# Patient Record
Sex: FEMALE | Race: WHITE | NOT HISPANIC OR LATINO | Employment: OTHER | URBAN - METROPOLITAN AREA
[De-identification: names, ages, dates, MRNs, and addresses within clinical notes are randomized per-mention and may not be internally consistent; named-entity substitution may affect disease eponyms.]

---

## 2019-08-20 ENCOUNTER — CONSULT (OUTPATIENT)
Dept: CARDIOLOGY | Facility: CLINIC | Age: 59
End: 2019-08-20

## 2019-08-20 VITALS
BODY MASS INDEX: 22.28 KG/M2 | DIASTOLIC BLOOD PRESSURE: 72 MMHG | HEIGHT: 68 IN | WEIGHT: 147 LBS | SYSTOLIC BLOOD PRESSURE: 114 MMHG | HEART RATE: 65 BPM

## 2019-08-20 DIAGNOSIS — Z82.49 FAMILY HISTORY OF CORONARY ARTERY DISEASE: ICD-10-CM

## 2019-08-20 DIAGNOSIS — R07.9 CHEST PAIN IN ADULT: ICD-10-CM

## 2019-08-20 DIAGNOSIS — R94.31 ABNORMAL EKG: Primary | ICD-10-CM

## 2019-08-20 PROCEDURE — 99204 OFFICE O/P NEW MOD 45 MIN: CPT | Performed by: INTERNAL MEDICINE

## 2019-08-20 PROCEDURE — 93000 ELECTROCARDIOGRAM COMPLETE: CPT | Performed by: INTERNAL MEDICINE

## 2019-08-20 RX ORDER — GABAPENTIN 300 MG/1
300 CAPSULE ORAL 3 TIMES DAILY
COMMUNITY
Start: 2019-07-17

## 2019-08-20 RX ORDER — FLUOXETINE HYDROCHLORIDE 40 MG/1
40 CAPSULE ORAL DAILY
COMMUNITY
Start: 2019-07-17

## 2019-08-20 RX ORDER — SODIUM PHOSPHATE,MONO-DIBASIC 19G-7G/118
500 ENEMA (ML) RECTAL
COMMUNITY

## 2019-08-20 RX ORDER — LORAZEPAM 1 MG/1
0.5 TABLET ORAL DAILY
COMMUNITY
Start: 2019-08-17

## 2019-08-20 RX ORDER — BIOTIN 10 MG
TABLET ORAL 3 TIMES WEEKLY
COMMUNITY

## 2019-08-20 RX ORDER — MELATONIN
5000 DAILY
COMMUNITY

## 2019-08-20 RX ORDER — FAMOTIDINE 20 MG/1
20 TABLET, FILM COATED ORAL DAILY
Qty: 30 TABLET | Refills: 11 | Status: SHIPPED | OUTPATIENT
Start: 2019-08-20

## 2019-08-20 RX ORDER — VITAMIN E 268 MG
800 CAPSULE ORAL DAILY
COMMUNITY

## 2019-08-20 RX ORDER — HYDROXYCHLOROQUINE SULFATE 200 MG/1
200 TABLET, FILM COATED ORAL 2 TIMES DAILY
COMMUNITY

## 2019-08-20 RX ORDER — ALPRAZOLAM 0.5 MG/1
TABLET ORAL DAILY
COMMUNITY
Start: 2019-07-28

## 2019-08-20 NOTE — PROGRESS NOTES
Encounter Date:08/20/2019    Location: [facility] MD    Patient ID: Lisa Mendez is a 59 y.o. female    1960  Subjective:      Chief Complaint/Reason for visit:    Chief Complaint   Patient presents with   • Atypical chest pain     Consult   • Dizziness   • Shortness of Breath       Problem List:  1. Chest pain  2. Shortness of breath  1. CXR 7/23/19: No acute cardiopulmonary disease  2. CTA of chest, 7/2019: No evidence of PE.  No evidence of aortic dissection.  3. Sleep apnea  4. History of DVT  5. GERD  6. Fibromyalgia  7. Rheumatoid arthritis  8. Surgeries:  1. Hysterectomy  2. Lumbar fusion L4-L5 with spinal cord stimulator  3. Bariatric surgery    HPI:  Patient is a 59-year-old female with the above-noted medical history presents today for further evaluation of her recent chest pain that occurred approximately 1 month ago.  She states that it occurred after eating lunch and was midsternal with radiation up to her shoulders and down to her hands as well as into her back.  She was seen and evaluated at [facility] and states that her cardiac work-up was negative.  She states that her pain can last anywhere from a few seconds up to 30 minutes in duration.  She notices occasional palpitations.  There is associated shortness of breath and slight dizziness but no vomiting or diaphoresis.  She states she had a history of stress test 6 to 7 years ago but does not recall ever having a cardiac catheterization.  Her father did have a known history of coronary disease.  Her lipids were checked in July of this year with an LDL of 87 and an HDL of 75.  She ambulates with a rolling walker due to chronic back issues so her physical activity is limited.  Her blood pressure and heart rate are adequately controlled on no medical therapy.      Cardiac ROS:  Positive for chest pain, shortness of breath, dyspnea on exertion, palpitations, dizziness.  Negative for orthopnea, PND, fatigue, edema,  pre-syncope/ syncope, snoring/ CEDRIC    Cardiac Risk Factors: sedentary lifestyle  Social History     Socioeconomic History   • Marital status:      Spouse name: Not on file   • Number of children: Not on file   • Years of education: Not on file   • Highest education level: Not on file   Tobacco Use   • Smoking status: Never Smoker   • Smokeless tobacco: Never Used   Substance and Sexual Activity   • Alcohol use: No     Frequency: Never   • Drug use: No   • Sexual activity: Defer       family history is not on file.     has a past medical history of Abnormal EKG (8/20/2019), Chest pain in adult (8/20/2019), and Family history of coronary artery disease (8/20/2019).    Allergies   Allergen Reactions   • Clindamycin/Lincomycin Other (See Comments)     Degenerative disc disease      • Darvon [Propoxyphene] Other (See Comments)     Caused Rheumatoid arthritis     • Doxycycline Other (See Comments)     Caused diverticulitis     • Levofloxacin Other (See Comments)     Caused Ulcers    • Rifampin Other (See Comments)     Cause Acid reflux     • Septra [Sulfamethoxazole-Trimethoprim] Other (See Comments)     Caused Fibromyalgia      • Tramadol Other (See Comments)     Caused Chronic nerve damage     • Ultram [Tramadol Hcl] Other (See Comments)     Sleep apnea           Current Outpatient Medications:   •  ALPRAZolam (XANAX) 0.5 MG tablet, Daily., Disp: , Rfl:   •  CALCIUM-VITAMIN D PO, Take  by mouth 2 (Two) Times a Day., Disp: , Rfl:   •  cholecalciferol (VITAMIN D3) 1000 units tablet, Take 5,000 Units by mouth Daily., Disp: , Rfl:   •  Cyanocobalamin (VITAMIN B-12) 3000 MCG sublingual tablet, Place  under the tongue 3 (Three) Times a Week., Disp: , Rfl:   •  FERROUS GLUCONATE PO, Take 324 mg by mouth Daily., Disp: , Rfl:   •  FLUoxetine (PROzac) 40 MG capsule, Daily., Disp: , Rfl:   •  gabapentin (NEURONTIN) 300 MG capsule, 3 (Three) Times a Day., Disp: , Rfl:   •  glucosamine sulfate 500 MG capsule capsule, Take 500  mg by mouth 3 (Three) Times a Day With Meals., Disp: , Rfl:   •  hydroxychloroquine (PLAQUENIL) 200 MG tablet, Take 200 mg by mouth 2 (Two) Times a Day., Disp: , Rfl:   •  LORazepam (ATIVAN) 1 MG tablet, Daily., Disp: , Rfl:   •  Multiple Vitamins-Minerals (MULTIVITAMIN ADULT PO), Take  by mouth 2 (Two) Times a Day., Disp: , Rfl:   •  Omega-3 Fatty Acids (OMEGA 3 PO), Take  by mouth 2 (Two) Times a Day., Disp: , Rfl:   •  vitamin E 400 UNIT capsule, Take 800 Units by mouth Daily., Disp: , Rfl:     Review of Systems   Constitution: Negative.   HENT: Negative.    Eyes: Negative.         Wears glasses   Cardiovascular: Positive for chest pain, dyspnea on exertion and palpitations.   Respiratory: Positive for shortness of breath.    Endocrine: Negative.    Hematologic/Lymphatic: Negative.    Skin: Negative.    Musculoskeletal: Positive for arthritis and back pain.   Gastrointestinal: Positive for heartburn.   Genitourinary: Negative.    Neurological: Positive for dizziness.   Psychiatric/Behavioral: Negative.    Allergic/Immunologic: Negative.        Vitals:    08/20/19 1308   BP: 114/72   Pulse: 65          Objective:       Physical Exam   Constitutional: She is oriented to person, place, and time. She appears well-developed and well-nourished.   HENT:   Head: Normocephalic and atraumatic.   Eyes: Pupils are equal, round, and reactive to light. Right eye exhibits no discharge. Left eye exhibits no discharge.   Neck: Normal range of motion. Neck supple. No JVD present.   Cardiovascular: Normal rate, regular rhythm, normal heart sounds and intact distal pulses. Exam reveals no gallop and no friction rub.   No murmur heard.  1+ distal pulses bilaterally   Pulmonary/Chest: Effort normal and breath sounds normal. She has no wheezes.   Abdominal: Soft. Bowel sounds are normal. There is no tenderness.   Musculoskeletal: Normal range of motion. She exhibits no edema.   Neurological: She is alert and oriented to person, place,  and time.   Skin: Skin is warm and dry.   Psychiatric: She has a normal mood and affect. Her behavior is normal.       Data Review:     ECG 12 Lead  Date/Time: 8/20/2019 1:29 PM  Performed by: Abbey Conde APRN  Authorized by: Abbey Conde APRN   Comparison: compared with previous ECG from 7/23/2019  Similar to previous ECG  Rhythm: sinus rhythm  Rate: normal  BPM: 65  Other findings: non-specific ST-T wave changes    Clinical impression: abnormal EKG        Assessment:      ICD-10-CM ICD-9-CM   1. Abnormal EKG R94.31 794.31   2. Chest pain in adult R07.9 786.50   3. Family history of coronary artery disease Z82.49 V17.3       Plan:  Lexiscan cardiolite to further assess symptoms  Start Pepcid 20mg daily   Encouraged routine activity as tolerated and dietary modifications  Continue current medications as directed  F/up in 4-6 weeks or sooner if needed.        Scribed for Eulalia Hermosillo MD by JAMEL Tang. 8/20/2019  4:31 PM     Eulalia Hermosillo MD, FACC

## 2019-09-17 ENCOUNTER — HOSPITAL ENCOUNTER (OUTPATIENT)
Dept: CARDIOLOGY | Facility: HOSPITAL | Age: 59
Discharge: HOME | End: 2019-09-17

## 2019-09-17 ENCOUNTER — HOSPITAL ENCOUNTER (OUTPATIENT)
Dept: CARDIOLOGY | Facility: HOSPITAL | Age: 59
Discharge: HOME | End: 2019-09-17
Admitting: NURSE PRACTITIONER

## 2019-09-17 DIAGNOSIS — R94.31 ABNORMAL EKG: ICD-10-CM

## 2019-09-17 LAB
BH CV STRESS BP STAGE 2: NORMAL
BH CV STRESS BP STAGE 4: NORMAL
BH CV STRESS COMMENTS STAGE 1: NORMAL
BH CV STRESS DOSE REGADENOSON STAGE 1: 0.4
BH CV STRESS DURATION MIN STAGE 1: 1
BH CV STRESS DURATION MIN STAGE 2: 1
BH CV STRESS DURATION MIN STAGE 3: 1
BH CV STRESS DURATION MIN STAGE 4: 1
BH CV STRESS DURATION SEC STAGE 1: 10
BH CV STRESS DURATION SEC STAGE 2: 0
BH CV STRESS HR STAGE 1: 65
BH CV STRESS HR STAGE 2: 71
BH CV STRESS HR STAGE 3: 68
BH CV STRESS HR STAGE 4: 66
BH CV STRESS PROTOCOL 1: NORMAL
BH CV STRESS RECOVERY BP: NORMAL MMHG
BH CV STRESS RECOVERY HR: 64 BPM
BH CV STRESS STAGE 1: 1
BH CV STRESS STAGE 2: 2
BH CV STRESS STAGE 3: 3
BH CV STRESS STAGE 4: 4
LV EF NUC BP: 74 %
MAXIMAL PREDICTED HEART RATE: 159 BPM
PERCENT MAX PREDICTED HR: 45.28 %
STRESS BASELINE BP: NORMAL MMHG
STRESS BASELINE HR: 53 BPM
STRESS PERCENT HR: 53 %
STRESS POST PEAK BP: NORMAL MMHG
STRESS POST PEAK HR: 72 BPM
STRESS TARGET HR: 135 BPM

## 2019-09-17 PROCEDURE — 25010000002 REGADENOSON 0.4 MG/5ML SOLUTION: Performed by: NURSE PRACTITIONER

## 2019-09-17 PROCEDURE — 78451 HT MUSCLE IMAGE SPECT SING: CPT

## 2019-09-17 PROCEDURE — 93017 CV STRESS TEST TRACING ONLY: CPT

## 2019-09-17 PROCEDURE — A9500 TC99M SESTAMIBI: HCPCS | Performed by: NURSE PRACTITIONER

## 2019-09-17 PROCEDURE — 93018 CV STRESS TEST I&R ONLY: CPT | Performed by: INTERNAL MEDICINE

## 2019-09-17 PROCEDURE — 0 TECHNETIUM SESTAMIBI: Performed by: NURSE PRACTITIONER

## 2019-09-17 PROCEDURE — 78451 HT MUSCLE IMAGE SPECT SING: CPT | Performed by: INTERNAL MEDICINE

## 2019-09-17 RX ADMIN — TECHNETIUM TC 99M SESTAMIBI 1 DOSE: 1 INJECTION INTRAVENOUS at 12:25

## 2019-09-17 RX ADMIN — TECHNETIUM TC 99M SESTAMIBI 1 DOSE: 1 INJECTION INTRAVENOUS at 10:50

## 2019-09-17 RX ADMIN — REGADENOSON 0.4 MG: 0.08 INJECTION, SOLUTION INTRAVENOUS at 12:28

## 2019-09-18 ENCOUNTER — OFFICE VISIT (OUTPATIENT)
Dept: CARDIOLOGY | Facility: CLINIC | Age: 59
End: 2019-09-18

## 2019-09-18 VITALS
HEIGHT: 68 IN | HEART RATE: 60 BPM | BODY MASS INDEX: 23.01 KG/M2 | OXYGEN SATURATION: 95 % | WEIGHT: 151.8 LBS | SYSTOLIC BLOOD PRESSURE: 110 MMHG | DIASTOLIC BLOOD PRESSURE: 60 MMHG

## 2019-09-18 DIAGNOSIS — R07.9 CHEST PAIN IN ADULT: ICD-10-CM

## 2019-09-18 DIAGNOSIS — R00.2 PALPITATIONS: Primary | ICD-10-CM

## 2019-09-18 PROBLEM — R06.02 SHORTNESS OF BREATH: Status: ACTIVE | Noted: 2019-09-18

## 2019-09-18 PROCEDURE — 99214 OFFICE O/P EST MOD 30 MIN: CPT | Performed by: INTERNAL MEDICINE

## 2019-09-18 RX ORDER — OXYCODONE AND ACETAMINOPHEN 7.5; 325 MG/1; MG/1
TABLET ORAL AS NEEDED
COMMUNITY
Start: 2019-07-31 | End: 2019-09-18

## 2019-09-18 NOTE — PROGRESS NOTES
[facility]  Lisa Mendez  1960  59 y.o.  [phone]      Date: 09/18/2019    PCP: Pan Carrion MD    Chief Complaint   Patient presents with   • Chest Pain       Problem List:  1. Chest pain:  a. MPS, 09/17/2019: No evidence of ischemia. EF 74%.  2. Shortness of breath:  a. CXR, 07/23/2019: No acute cardiopulmonary disease.  b. CTA of chest, 07/2019: No evidence of PE.  No evidence of aortic dissection.  3. Palpitations  4. Sleep apnea  5. History of DVT  6. GERD  7. Fibromyalgia  8. Rheumatoid arthritis  9. Surgeries:  a. Hysterectomy  b. Lumbar fusion L4-L5 with spinal cord stimulator  c. Bariatric surgery       Allergies   Allergen Reactions   • Clindamycin/Lincomycin Other (See Comments)     Degenerative disc disease      • Darvon [Propoxyphene] Other (See Comments)     Caused Rheumatoid arthritis     • Doxycycline Other (See Comments)     Caused diverticulitis     • Levofloxacin Other (See Comments)     Caused Ulcers    • Rifampin Other (See Comments)     Cause Acid reflux     • Septra [Sulfamethoxazole-Trimethoprim] Other (See Comments)     Caused Fibromyalgia      • Tramadol Other (See Comments)     Caused Chronic nerve damage     • Ultram [Tramadol Hcl] Other (See Comments)     Sleep apnea         Current Medications:      Current Outpatient Medications:   •  ALPRAZolam (XANAX) 0.5 MG tablet, Daily., Disp: , Rfl:   •  CALCIUM-VITAMIN D PO, Take  by mouth 2 (Two) Times a Day., Disp: , Rfl:   •  cholecalciferol (VITAMIN D3) 1000 units tablet, Take 5,000 Units by mouth Daily., Disp: , Rfl:   •  Cyanocobalamin (VITAMIN B-12) 3000 MCG sublingual tablet, Place  under the tongue 3 (Three) Times a Week., Disp: , Rfl:   •  famotidine (PEPCID) 20 MG tablet, Take 1 tablet by mouth Daily., Disp: 30 tablet, Rfl: 11  •  FERROUS GLUCONATE PO, Take 324 mg by mouth Daily., Disp: , Rfl:   •  FLUoxetine (PROzac) 40 MG capsule, Daily., Disp: , Rfl:   •   "gabapentin (NEURONTIN) 300 MG capsule, 3 (Three) Times a Day., Disp: , Rfl:   •  glucosamine sulfate 500 MG capsule capsule, Take 500 mg by mouth 3 (Three) Times a Day With Meals., Disp: , Rfl:   •  hydroxychloroquine (PLAQUENIL) 200 MG tablet, Take 200 mg by mouth 2 (Two) Times a Day., Disp: , Rfl:   •  LORazepam (ATIVAN) 1 MG tablet, Daily., Disp: , Rfl:   •  Multiple Vitamins-Minerals (MULTIVITAMIN ADULT PO), Take  by mouth 2 (Two) Times a Day., Disp: , Rfl:   •  Omega-3 Fatty Acids (OMEGA 3 PO), Take  by mouth 2 (Two) Times a Day., Disp: , Rfl:   •  vitamin E 400 UNIT capsule, Take 800 Units by mouth Daily., Disp: , Rfl:   No current facility-administered medications for this visit.     HPI    Lisa Mendez is a 59 y.o. female who presents today for 4 week follow up of chest pain. Since last visit, she has continued to experience chest pain and occasional palpitations. Her daily Pepcid has helped manage some of her chest pains, which occur intermittently, both at rest and with exercise. She denies any palpitations during her stress test. She experiences them with varying frequency. When they occur, patient reports a lot of fatigue. She takes up to a day to recover from her palpitations, which can occur as rarely as once a week or as frequently as a couple times per week. Patient has had to take some time off work to recover from these palpitations. Patient denies edema, dizziness, and syncope.     The following portions of the patient's history were reviewed and updated as appropriate: allergies, current medications and problem list.    Pertinent positives as listed in the HPI.  All other systems reviewed are negative.    Vitals:    09/18/19 1034   BP: 110/60   BP Location: Right arm   Patient Position: Sitting   Pulse: 60   SpO2: 95%   Weight: 68.9 kg (151 lb 12.8 oz)   Height: 172.7 cm (68\")       Physical Exam:    General: Alert and oriented.  Neck: Jugular venous pressure is within normal limits. Carotids " have normal upstrokes without bruits.   Cardiovascular: Heart has a nondisplaced focal PMI. Regular rate and rhythm without murmur, gallop or rub.  Lungs: Clear without rales or wheezes. Equal expansion is noted.   Extremities: Show no edema.  Skin: Warm and dry.  Neurologic: Nonfocal.    Diagnostic Data:    Procedures    Assessment:      ICD-10-CM ICD-9-CM   1. Palpitations R00.2 785.1   2. Chest pain in adult R07.9 786.50     Plan:    1. 30 day event monitor to assess for PAF given patient's palpitations and subsequent fatigue.  2. Increase Pepcid to 20 mg BID for chest pain, likely of GI etiology.  3. Continue all other current medications.  4. F/up in 5-6 weeks, sooner if needed.      Scribed for Eulalia Hermosillo MD by Julissa Linares. 9/18/2019  11:00 AM     Eulalia Hermosillo MD, FACC

## 2019-10-31 ENCOUNTER — OFFICE VISIT (OUTPATIENT)
Dept: CARDIOLOGY | Facility: CLINIC | Age: 59
End: 2019-10-31

## 2019-10-31 VITALS
SYSTOLIC BLOOD PRESSURE: 114 MMHG | HEART RATE: 61 BPM | DIASTOLIC BLOOD PRESSURE: 60 MMHG | WEIGHT: 154 LBS | HEIGHT: 68 IN | BODY MASS INDEX: 23.34 KG/M2

## 2019-10-31 DIAGNOSIS — Z82.49 FAMILY HISTORY OF CORONARY ARTERY DISEASE: ICD-10-CM

## 2019-10-31 DIAGNOSIS — F41.9 ANXIETY: ICD-10-CM

## 2019-10-31 DIAGNOSIS — I49.1 PAC (PREMATURE ATRIAL CONTRACTION): ICD-10-CM

## 2019-10-31 DIAGNOSIS — I49.3 PVC (PREMATURE VENTRICULAR CONTRACTION): ICD-10-CM

## 2019-10-31 DIAGNOSIS — R07.89 ATYPICAL CHEST PAIN: ICD-10-CM

## 2019-10-31 DIAGNOSIS — R00.2 PALPITATIONS: Primary | ICD-10-CM

## 2019-10-31 PROBLEM — R06.02 SHORTNESS OF BREATH: Status: RESOLVED | Noted: 2019-09-18 | Resolved: 2019-10-31

## 2019-10-31 PROBLEM — R94.31 ABNORMAL EKG: Status: RESOLVED | Noted: 2019-08-20 | Resolved: 2019-10-31

## 2019-10-31 PROCEDURE — 99214 OFFICE O/P EST MOD 30 MIN: CPT | Performed by: NURSE PRACTITIONER

## 2019-10-31 NOTE — PROGRESS NOTES
[facility]  Lisa Mendez  1960  59 y.o.  [phone]      Date: 10/31/2019    PCP: Pan Carrion MD    Chief Complaint   Patient presents with   • Palpitations       Problem List:  1. Chest pain:  a. MPS, 09/17/2019: No evidence of ischemia. EF 74%.  2. Shortness of breath:  a. CXR, 07/23/2019: No acute cardiopulmonary disease.  b. CTA of chest, 07/2019: No evidence of PE. No evidence of aortic dissection.  3. Palpitations:  a. Cardiac event monitor, 09/18/2019, 30 days: SR/PAC/PVC.  4. Sleep apnea  5. History of DVT  6. GERD  7. Fibromyalgia  8. Rheumatoid arthritis  9. Surgeries:  a. Hysterectomy  b. Lumbar fusion L4-L5 with spinal cord stimulator  c. Bariatric surgery    Allergies   Allergen Reactions   • Clindamycin/Lincomycin Other (See Comments)     Degenerative disc disease      • Darvon [Propoxyphene] Other (See Comments)     Caused Rheumatoid arthritis     • Doxycycline Other (See Comments)     Caused diverticulitis     • Levofloxacin Other (See Comments)     Caused Ulcers    • Rifampin Other (See Comments)     Cause Acid reflux     • Septra [Sulfamethoxazole-Trimethoprim] Other (See Comments)     Caused Fibromyalgia      • Tramadol Other (See Comments)     Caused Chronic nerve damage     • Ultram [Tramadol Hcl] Other (See Comments)     Sleep apnea         Current Medications:      Current Outpatient Medications:   •  ALPRAZolam (XANAX) 0.5 MG tablet, Daily., Disp: , Rfl:   •  CALCIUM-VITAMIN D PO, Take  by mouth 2 (Two) Times a Day., Disp: , Rfl:   •  cholecalciferol (VITAMIN D3) 1000 units tablet, Take 5,000 Units by mouth Daily., Disp: , Rfl:   •  Cyanocobalamin (VITAMIN B-12) 3000 MCG sublingual tablet, Place  under the tongue 3 (Three) Times a Week., Disp: , Rfl:   •  famotidine (PEPCID) 20 MG tablet, Take 1 tablet by mouth Daily., Disp: 30 tablet, Rfl: 11  •  FERROUS GLUCONATE PO, Take 324 mg by mouth Daily., Disp: , Rfl:   •  FLUoxetine  (PROzac) 40 MG capsule, Take 40 mg by mouth Daily., Disp: , Rfl:   •  gabapentin (NEURONTIN) 300 MG capsule, Take 300 mg by mouth 3 (Three) Times a Day., Disp: , Rfl:   •  glucosamine sulfate 500 MG capsule capsule, Take 500 mg by mouth 3 (Three) Times a Day With Meals., Disp: , Rfl:   •  hydroxychloroquine (PLAQUENIL) 200 MG tablet, Take 200 mg by mouth 2 (Two) Times a Day., Disp: , Rfl:   •  LORazepam (ATIVAN) 1 MG tablet, Take 0.5 mg by mouth Daily., Disp: , Rfl:   •  Multiple Vitamins-Minerals (MULTIVITAMIN ADULT PO), Take 1 tablet by mouth 2 (Two) Times a Day., Disp: , Rfl:   •  Omega-3 Fatty Acids (OMEGA 3 PO), Take 1 capsule by mouth 2 (Two) Times a Day., Disp: , Rfl:   •  vitamin E 400 UNIT capsule, Take 800 Units by mouth Daily., Disp: , Rfl:     HPI    Lisa Mendez is a 59 y.o. female who presents today for 6 week follow up of chest pain, shortness of air, and palpitations. Since last visit the patient wore 30-day monitor.  No atrial fibrillation was detected.  Her symptoms course bonded with sinus rhythm and occasional PVCs and PACs.  The patient denies having any further chest pain after her Pepcid dose was increased at her last visit.  She denies any weakness, lightheadedness or syncope.  She had a stress test in September that was negative for ischemia.  She has not had an echocardiogram.  She reports having fatigue but also reports she has fibromyalgia.  She is under a lot of stress at home and she reports her palpitations often make her feel nervous and increases her anxiety levels.  She reports they are brief when they occur.      The following portions of the patient's history were reviewed and updated as appropriate: allergies, current medications and problem list.    Pertinent positives as listed in the HPI.  All other systems reviewed are negative.    Vitals:    10/31/19 0921   BP: 114/60   BP Location: Right arm   Patient Position: Sitting   Pulse: 61   Weight: 69.9 kg (154 lb)   Height:  "172.7 cm (68\")       Physical Exam:    General: Alert and oriented.  Neck: Jugular venous pressure is within normal limits. Carotids have normal upstrokes without bruits.   Cardiovascular: Heart has a nondisplaced focal PMI. Regular rate and rhythm without murmur, gallop or rub.  Lungs: Clear without rales or wheezes. Equal expansion is noted.   Extremities: Show no edema.  Skin: Warm and dry.  Neurologic: Nonfocal.    Diagnostic Data:    Procedures    Assessment:      ICD-10-CM ICD-9-CM   1. Palpitations R00.2 785.1   2. PAC (premature atrial contraction) I49.1 427.61   3. PVC (premature ventricular contraction) I49.3 427.69   4. Family history of coronary artery disease Z82.49 V17.3   5. Atypical chest pain R07.89 786.59   6. Anxiety F41.9 300.00     Lab results found above were reviewed with the patient.  Patient's monitor showed rare PACs and PVCs.  We discussed that treatment is not necessary and would be more for her comfort.  Concern is initiating a beta-blocker may make her fatigue worse with her fibromyalgia.  Plus she has a baseline heart rate is 61 and blood pressure 114/60.  The patient reports she does not really feel bad when they happen but more nervous than anything.  She actually reports they have decreased since her last visit and they are transient and brief when they occur.  I will obtain an echocardiogram as she has not had one.  If her echo is cardiogram is normal we will defer any medical therapy.  We will have her follow-up in 6 months for reassessment of her symptoms.    Plan:    1. Benign 30-day monitor  2. Obtain echocardiogram  3. Continue all other current medications.  4. Patient to contact us if palpitations progress or worsen  5. F/up in 6 months or sooner if needed.    Janie Shea, JAMEL          "

## 2019-12-05 ENCOUNTER — HOSPITAL ENCOUNTER (OUTPATIENT)
Dept: CARDIOLOGY | Facility: HOSPITAL | Age: 59
Discharge: HOME | End: 2019-12-05
Admitting: NURSE PRACTITIONER

## 2019-12-05 VITALS — HEIGHT: 68 IN | WEIGHT: 154 LBS | BODY MASS INDEX: 23.34 KG/M2

## 2019-12-05 DIAGNOSIS — I49.3 PVC (PREMATURE VENTRICULAR CONTRACTION): ICD-10-CM

## 2019-12-05 DIAGNOSIS — I49.1 PAC (PREMATURE ATRIAL CONTRACTION): ICD-10-CM

## 2019-12-05 DIAGNOSIS — R00.2 PALPITATIONS: ICD-10-CM

## 2019-12-05 PROCEDURE — 93306 TTE W/DOPPLER COMPLETE: CPT | Performed by: INTERNAL MEDICINE

## 2019-12-05 PROCEDURE — 93306 TTE W/DOPPLER COMPLETE: CPT

## 2019-12-06 LAB
BH CV ECHO MEAS - AO MAX PG (FULL): 2.4 MMHG
BH CV ECHO MEAS - AO MAX PG: 6 MMHG
BH CV ECHO MEAS - AO MEAN PG (FULL): 1 MMHG
BH CV ECHO MEAS - AO MEAN PG: 3 MMHG
BH CV ECHO MEAS - AO ROOT AREA (BSA CORRECTED): 1.4
BH CV ECHO MEAS - AO ROOT AREA: 4.9 CM^2
BH CV ECHO MEAS - AO ROOT DIAM: 2.5 CM
BH CV ECHO MEAS - AO V2 MAX: 119 CM/SEC
BH CV ECHO MEAS - AO V2 MEAN: 80.7 CM/SEC
BH CV ECHO MEAS - AO V2 VTI: 29.7 CM
BH CV ECHO MEAS - AVA(I,A): 1.9 CM^2
BH CV ECHO MEAS - AVA(I,D): 1.9 CM^2
BH CV ECHO MEAS - AVA(V,A): 2 CM^2
BH CV ECHO MEAS - AVA(V,D): 2 CM^2
BH CV ECHO MEAS - BSA(HAYCOCK): 1.8 M^2
BH CV ECHO MEAS - BSA: 1.8 M^2
BH CV ECHO MEAS - BZI_BMI: 23.4 KILOGRAMS/M^2
BH CV ECHO MEAS - BZI_METRIC_HEIGHT: 172.7 CM
BH CV ECHO MEAS - BZI_METRIC_WEIGHT: 69.9 KG
BH CV ECHO MEAS - EDV(CUBED): 79.5 ML
BH CV ECHO MEAS - EDV(MOD-SP2): 77.9 ML
BH CV ECHO MEAS - EDV(MOD-SP4): 75.9 ML
BH CV ECHO MEAS - EDV(TEICH): 83.1 ML
BH CV ECHO MEAS - EF(CUBED): 54.8 %
BH CV ECHO MEAS - EF(MOD-BP): 59.2 %
BH CV ECHO MEAS - EF(MOD-SP2): 63.5 %
BH CV ECHO MEAS - EF(MOD-SP4): 56.4 %
BH CV ECHO MEAS - EF(TEICH): 46.9 %
BH CV ECHO MEAS - ESV(CUBED): 35.9 ML
BH CV ECHO MEAS - ESV(MOD-SP2): 28.4 ML
BH CV ECHO MEAS - ESV(MOD-SP4): 33.1 ML
BH CV ECHO MEAS - ESV(TEICH): 44.1 ML
BH CV ECHO MEAS - FS: 23.3 %
BH CV ECHO MEAS - IVS/LVPW: 0.71
BH CV ECHO MEAS - IVSD: 0.5 CM
BH CV ECHO MEAS - LA DIMENSION: 3.4 CM
BH CV ECHO MEAS - LA/AO: 1.4
BH CV ECHO MEAS - LAD MAJOR: 4.7 CM
BH CV ECHO MEAS - LAT PEAK E' VEL: 9.6 CM/SEC
BH CV ECHO MEAS - LATERAL E/E' RATIO: 9.5
BH CV ECHO MEAS - LV DIASTOLIC VOL/BSA (35-75): 41.5 ML/M^2
BH CV ECHO MEAS - LV MASS(C)D: 72.9 GRAMS
BH CV ECHO MEAS - LV MASS(C)DI: 39.8 GRAMS/M^2
BH CV ECHO MEAS - LV MAX PG: 3.6 MMHG
BH CV ECHO MEAS - LV MEAN PG: 2 MMHG
BH CV ECHO MEAS - LV SYSTOLIC VOL/BSA (12-30): 18.1 ML/M^2
BH CV ECHO MEAS - LV V1 MAX: 95.3 CM/SEC
BH CV ECHO MEAS - LV V1 MEAN: 66.4 CM/SEC
BH CV ECHO MEAS - LV V1 VTI: 21.9 CM
BH CV ECHO MEAS - LVIDD: 4.3 CM
BH CV ECHO MEAS - LVIDS: 3.3 CM
BH CV ECHO MEAS - LVLD AP2: 6.9 CM
BH CV ECHO MEAS - LVLD AP4: 7.5 CM
BH CV ECHO MEAS - LVLS AP2: 5.4 CM
BH CV ECHO MEAS - LVLS AP4: 6 CM
BH CV ECHO MEAS - LVOT AREA (M): 2.5 CM^2
BH CV ECHO MEAS - LVOT AREA: 2.5 CM^2
BH CV ECHO MEAS - LVOT DIAM: 1.8 CM
BH CV ECHO MEAS - LVPWD: 0.7 CM
BH CV ECHO MEAS - MED PEAK E' VEL: 7.6 CM/SEC
BH CV ECHO MEAS - MEDIAL E/E' RATIO: 11.9
BH CV ECHO MEAS - MV A MAX VEL: 47.6 CM/SEC
BH CV ECHO MEAS - MV DEC TIME: 0.3 SEC
BH CV ECHO MEAS - MV E MAX VEL: 90.8 CM/SEC
BH CV ECHO MEAS - MV E/A: 1.9
BH CV ECHO MEAS - PA ACC TIME: 0.19 SEC
BH CV ECHO MEAS - PA MAX PG: 2.7 MMHG
BH CV ECHO MEAS - PA MEAN PG: 1 MMHG
BH CV ECHO MEAS - PA PR(ACCEL): -4.9 MMHG
BH CV ECHO MEAS - PA V2 MAX: 81.7 CM/SEC
BH CV ECHO MEAS - PA V2 MEAN: 58.7 CM/SEC
BH CV ECHO MEAS - PA V2 VTI: 20.4 CM
BH CV ECHO MEAS - RAP SYSTOLE: 8 MMHG
BH CV ECHO MEAS - RVSP: 29 MMHG
BH CV ECHO MEAS - SI(AO): 79.7 ML/M^2
BH CV ECHO MEAS - SI(CUBED): 23.8 ML/M^2
BH CV ECHO MEAS - SI(LVOT): 30.5 ML/M^2
BH CV ECHO MEAS - SI(MOD-SP2): 27.1 ML/M^2
BH CV ECHO MEAS - SI(MOD-SP4): 23.4 ML/M^2
BH CV ECHO MEAS - SI(TEICH): 21.3 ML/M^2
BH CV ECHO MEAS - SV(AO): 145.8 ML
BH CV ECHO MEAS - SV(CUBED): 43.6 ML
BH CV ECHO MEAS - SV(LVOT): 55.7 ML
BH CV ECHO MEAS - SV(MOD-SP2): 49.5 ML
BH CV ECHO MEAS - SV(MOD-SP4): 42.8 ML
BH CV ECHO MEAS - SV(TEICH): 38.9 ML
BH CV ECHO MEAS - TAPSE (>1.6): 2.36 CM2
BH CV ECHO MEAS - TR MAX PG: 21 MMHG
BH CV ECHO MEAS - TR MAX VEL: 226.5 CM/SEC
BH CV ECHO MEASUREMENTS AVERAGE E/E' RATIO: 10.56
BH CV VAS BP LEFT ARM: NORMAL MMHG
BH CV XLRA - RV BASE: 3.3 CM
BH CV XLRA - RV LENGTH: 5.6 CM
BH CV XLRA - RV MID: 2.1 CM
BH CV XLRA - TDI S': 12 CM/SEC
LEFT ATRIUM VOLUME INDEX: 29.6 ML/M^2
LEFT ATRIUM VOLUME: 54.2 ML
LV EF 2D ECHO EST: 55 %

## 2020-06-10 NOTE — PROGRESS NOTES
[facility]    Patient ID: Lisa Mendez is a 60 y.o. female.  : 1960   Contact: [phone]    Encounter date: 2020    PCP: Pan Carrion MD      Chief complaint:   Chief Complaint   Patient presents with   • Palpitations     F/U       Problem List:  1. Chest pain:  a. MPS, 2019: No evidence of ischemia. EF 74%.  2. Shortness of breath:  a. CXR, 2019: No acute cardiopulmonary disease.  b. CTA of chest, 2019: No evidence of PE. No evidence of aortic dissection.  3. Palpitations:  a. Cardiac event monitor, 2019, 30 days: SR/PAC/PVC.  No atrial fibrillation  b. Echo, 2019: EF 55%. Mild MR/TR. RVSP 29 mmHg.  4. Sleep apnea  5. History of DVT  6. GERD  7. Fibromyalgia  8. Rheumatoid arthritis  9. Surgeries:  a. Hysterectomy  b. Lumbar fusion L4-L5 with spinal cord stimulator  c. Bariatric surgery    Allergies   Allergen Reactions   • Clindamycin/Lincomycin Other (See Comments)     Degenerative disc disease      • Darvon [Propoxyphene] Other (See Comments)     Caused Rheumatoid arthritis     • Doxycycline Other (See Comments)     Caused diverticulitis     • Levofloxacin Other (See Comments)     Caused Ulcers    • Rifampin Other (See Comments)     Cause Acid reflux     • Septra [Sulfamethoxazole-Trimethoprim] Other (See Comments)     Caused Fibromyalgia      • Tramadol Other (See Comments)     Caused Chronic nerve damage     • Ultram [Tramadol Hcl] Other (See Comments)     Sleep apnea         Current Medications:    Current Outpatient Medications:   •  ALPRAZolam (XANAX) 0.5 MG tablet, Daily., Disp: , Rfl:   •  CALCIUM-VITAMIN D PO, Take  by mouth 2 (Two) Times a Day., Disp: , Rfl:   •  cholecalciferol (VITAMIN D3) 1000 units tablet, Take 5,000 Units by mouth Daily., Disp: , Rfl:   •  Cyanocobalamin (VITAMIN B-12) 3000 MCG sublingual tablet, Place  under the tongue 3 (Three) Times a Week., Disp: , Rfl:   •  famotidine (PEPCID) 20  "MG tablet, Take 1 tablet by mouth Daily., Disp: 30 tablet, Rfl: 11  •  FERROUS GLUCONATE PO, Take 324 mg by mouth Daily., Disp: , Rfl:   •  FLUoxetine (PROzac) 40 MG capsule, Take 40 mg by mouth Daily., Disp: , Rfl:   •  gabapentin (NEURONTIN) 300 MG capsule, Take 300 mg by mouth 3 (Three) Times a Day., Disp: , Rfl:   •  glucosamine sulfate 500 MG capsule capsule, Take 500 mg by mouth 3 (Three) Times a Day With Meals., Disp: , Rfl:   •  hydroxychloroquine (PLAQUENIL) 200 MG tablet, Take 200 mg by mouth 2 (Two) Times a Day., Disp: , Rfl:   •  LORazepam (ATIVAN) 1 MG tablet, Take 0.5 mg by mouth Daily., Disp: , Rfl:   •  Multiple Vitamins-Minerals (MULTIVITAMIN ADULT PO), Take 1 tablet by mouth 2 (Two) Times a Day., Disp: , Rfl:   •  Omega-3 Fatty Acids (OMEGA 3 PO), Take 1 capsule by mouth 2 (Two) Times a Day., Disp: , Rfl:   •  vitamin E 400 UNIT capsule, Take 800 Units by mouth Daily., Disp: , Rfl:     HPI    Lisa Mendez is a 60 y.o. female who presents today for a 6 month follow up of palpitations and shortness of breath. Patient states in April she was sick with bilateral pneumonia and has been experiencing palpitations since. She notes she was feeling well before becoming ill and has otherwise been feeling well from a cardiovascular standpoint. Patient denies shortness of breath, edema, chest pain, dizziness and syncope.  We reviewed her echo and 30-day monitor results.  There is no evidence of atrial fibrillation.  Just PACs and PVCs.      The following portions of the patient's history were reviewed and updated as appropriate: allergies, current medications and problem list.    Pertinent positives as listed in the HPI.  All other systems reviewed are negative.         Vitals:    06/11/20 0902   BP: 101/58   BP Location: Left arm   Patient Position: Sitting   Pulse: 83   SpO2: 99%   Weight: 69.4 kg (153 lb)   Height: 172.7 cm (68\")       Physical Exam:  General: Alert and oriented.  Neck: Jugular venous " pressure is within normal limits. Carotids have normal upstrokes without bruits.   Cardiovascular: Heart has a nondisplaced focal PMI. Regular rate and rhythm. No murmur, gallop or rub.  Lungs: Clear, no rales or wheezes. Equal expansion is noted.   Extremities: Show no edema.  Skin: Warm and dry.  Neurologic: Nonfocal.     Diagnostic Data (reviewed with patient):    07/25/2019:    TG 82  HDL 75  LDL 87    Procedures      Assessment:    ICD-10-CM ICD-9-CM   1. PVC (premature ventricular contraction) I49.3 427.69   2. PAC (premature atrial contraction) I49.1 427.61        Discussed monitor results from September 2019 and echo from December 2019 with patient.       Plan:  1. Stable cardiac status.  2. Continue current medications.  3. F/up as needed.    Scribed for Eulalia Hermosillo MD by Melissa Troncoso. 6/11/2020  09:16     I Eulalia Hermosillo MD personally performed the services described in this documentation as scribed by the above individual in my presence, and it is both accurate and complete.    Eulalia Hermosillo MD, FACC                 Name band;

## 2020-06-11 ENCOUNTER — OFFICE VISIT (OUTPATIENT)
Dept: CARDIOLOGY | Facility: CLINIC | Age: 60
End: 2020-06-11

## 2020-06-11 VITALS
DIASTOLIC BLOOD PRESSURE: 58 MMHG | WEIGHT: 153 LBS | HEIGHT: 68 IN | SYSTOLIC BLOOD PRESSURE: 101 MMHG | OXYGEN SATURATION: 99 % | BODY MASS INDEX: 23.19 KG/M2 | HEART RATE: 83 BPM

## 2020-06-11 DIAGNOSIS — I49.3 PVC (PREMATURE VENTRICULAR CONTRACTION): Primary | ICD-10-CM

## 2020-06-11 DIAGNOSIS — I49.1 PAC (PREMATURE ATRIAL CONTRACTION): ICD-10-CM

## 2020-06-11 PROCEDURE — 99213 OFFICE O/P EST LOW 20 MIN: CPT | Performed by: INTERNAL MEDICINE

## 2020-12-21 ENCOUNTER — LAB (OUTPATIENT)
Dept: LAB | Facility: HOSPITAL | Age: 60
End: 2020-12-21

## 2020-12-21 ENCOUNTER — TRANSCRIBE ORDERS (OUTPATIENT)
Dept: LAB | Facility: HOSPITAL | Age: 60
End: 2020-12-21

## 2020-12-21 DIAGNOSIS — R76.12 NONSPECIFIC REACTION TO CELL MEDIATED IMMUNITY MEASUREMENT OF GAMMA INTERFERON ANTIGEN RESPONSE WITHOUT ACTIVE TUBERCULOSIS: ICD-10-CM

## 2020-12-21 DIAGNOSIS — R76.12 NONSPECIFIC REACTION TO CELL MEDIATED IMMUNITY MEASUREMENT OF GAMMA INTERFERON ANTIGEN RESPONSE WITHOUT ACTIVE TUBERCULOSIS: Primary | ICD-10-CM

## 2020-12-21 PROCEDURE — 86481 TB AG RESPONSE T-CELL SUSP: CPT

## 2020-12-21 PROCEDURE — 36415 COLL VENOUS BLD VENIPUNCTURE: CPT

## 2020-12-23 LAB
TSPOT INTERPRETATION: POSITIVE
TSPOT NIL CONTROL INTERPRETATION: ABNORMAL
TSPOT PANEL A: 3
TSPOT PANEL B: 20
TSPOT POS CONTROL INTERPRETATION: ABNORMAL

## 2021-03-03 ENCOUNTER — LAB (OUTPATIENT)
Dept: LAB | Facility: HOSPITAL | Age: 61
End: 2021-03-03

## 2021-03-03 ENCOUNTER — TRANSCRIBE ORDERS (OUTPATIENT)
Dept: LAB | Facility: HOSPITAL | Age: 61
End: 2021-03-03

## 2021-03-03 DIAGNOSIS — M06.09 RHEUMATOID ARTHRITIS OF MULTIPLE SITES WITHOUT RHEUMATOID FACTOR (HCC): ICD-10-CM

## 2021-03-03 DIAGNOSIS — M05.79 SEROPOSITIVE RHEUMATOID ARTHRITIS OF MULTIPLE SITES (HCC): ICD-10-CM

## 2021-03-03 DIAGNOSIS — R76.12 NONSPECIFIC REACTION TO CELL MEDIATED IMMUNITY MEASUREMENT OF GAMMA INTERFERON ANTIGEN RESPONSE WITHOUT ACTIVE TUBERCULOSIS: ICD-10-CM

## 2021-03-03 DIAGNOSIS — M79.7 SCAPULOHUMERAL FIBROSITIS: ICD-10-CM

## 2021-03-03 DIAGNOSIS — R76.12 NONSPECIFIC REACTION TO CELL MEDIATED IMMUNITY MEASUREMENT OF GAMMA INTERFERON ANTIGEN RESPONSE WITHOUT ACTIVE TUBERCULOSIS: Primary | ICD-10-CM

## 2021-03-03 LAB
ALBUMIN SERPL-MCNC: 3.6 G/DL (ref 3.5–5.2)
ALBUMIN/GLOB SERPL: 1.3 G/DL
ALP SERPL-CCNC: 76 U/L (ref 39–117)
ALT SERPL W P-5'-P-CCNC: 29 U/L (ref 1–33)
ANION GAP SERPL CALCULATED.3IONS-SCNC: 3 MMOL/L (ref 5–15)
AST SERPL-CCNC: 46 U/L (ref 1–32)
BILIRUB SERPL-MCNC: 0.3 MG/DL (ref 0–1.2)
BUN SERPL-MCNC: 11 MG/DL (ref 8–23)
BUN/CREAT SERPL: 14.7 (ref 7–25)
CALCIUM SPEC-SCNC: 9.1 MG/DL (ref 8.6–10.5)
CHLORIDE SERPL-SCNC: 106 MMOL/L (ref 98–107)
CO2 SERPL-SCNC: 35 MMOL/L (ref 22–29)
CREAT SERPL-MCNC: 0.75 MG/DL (ref 0.57–1)
DEPRECATED RDW RBC AUTO: 47.8 FL (ref 37–54)
ERYTHROCYTE [DISTWIDTH] IN BLOOD BY AUTOMATED COUNT: 13.5 % (ref 12.3–15.4)
GFR SERPL CREATININE-BSD FRML MDRD: 78 ML/MIN/1.73
GLOBULIN UR ELPH-MCNC: 2.8 GM/DL
GLUCOSE SERPL-MCNC: 115 MG/DL (ref 65–99)
HCT VFR BLD AUTO: 35.6 % (ref 34–46.6)
HGB BLD-MCNC: 11.1 G/DL (ref 12–15.9)
MCH RBC QN AUTO: 29.7 PG (ref 26.6–33)
MCHC RBC AUTO-ENTMCNC: 31.2 G/DL (ref 31.5–35.7)
MCV RBC AUTO: 95.2 FL (ref 79–97)
PLATELET # BLD AUTO: 189 10*3/MM3 (ref 140–450)
PMV BLD AUTO: 10.3 FL (ref 6–12)
POTASSIUM SERPL-SCNC: 4 MMOL/L (ref 3.5–5.2)
PROT SERPL-MCNC: 6.4 G/DL (ref 6–8.5)
RBC # BLD AUTO: 3.74 10*6/MM3 (ref 3.77–5.28)
SODIUM SERPL-SCNC: 144 MMOL/L (ref 136–145)
WBC # BLD AUTO: 4.33 10*3/MM3 (ref 3.4–10.8)

## 2021-03-03 PROCEDURE — 36415 COLL VENOUS BLD VENIPUNCTURE: CPT

## 2021-03-03 PROCEDURE — 85027 COMPLETE CBC AUTOMATED: CPT

## 2021-03-03 PROCEDURE — 80053 COMPREHEN METABOLIC PANEL: CPT

## 2021-04-21 ENCOUNTER — LAB (OUTPATIENT)
Dept: LAB | Facility: HOSPITAL | Age: 61
End: 2021-04-21

## 2021-04-21 ENCOUNTER — TRANSCRIBE ORDERS (OUTPATIENT)
Dept: LAB | Facility: HOSPITAL | Age: 61
End: 2021-04-21

## 2021-04-21 DIAGNOSIS — M06.09 RHEUMATOID ARTHRITIS OF MULTIPLE SITES WITHOUT RHEUMATOID FACTOR (HCC): ICD-10-CM

## 2021-04-21 DIAGNOSIS — R76.12 NONSPECIFIC REACTION TO CELL MEDIATED IMMUNITY MEASUREMENT OF GAMMA INTERFERON ANTIGEN RESPONSE WITHOUT ACTIVE TUBERCULOSIS: ICD-10-CM

## 2021-04-21 DIAGNOSIS — M05.79 SEROPOSITIVE RHEUMATOID ARTHRITIS OF MULTIPLE SITES (HCC): ICD-10-CM

## 2021-04-21 DIAGNOSIS — M79.7 SCAPULOHUMERAL FIBROSITIS: ICD-10-CM

## 2021-04-21 DIAGNOSIS — R76.12 NONSPECIFIC REACTION TO CELL MEDIATED IMMUNITY MEASUREMENT OF GAMMA INTERFERON ANTIGEN RESPONSE WITHOUT ACTIVE TUBERCULOSIS: Primary | ICD-10-CM

## 2021-04-21 LAB
ALBUMIN SERPL-MCNC: 3.9 G/DL (ref 3.5–5.2)
ALBUMIN/GLOB SERPL: 1.2 G/DL
ALP SERPL-CCNC: 87 U/L (ref 39–117)
ALT SERPL W P-5'-P-CCNC: 33 U/L (ref 1–33)
ANION GAP SERPL CALCULATED.3IONS-SCNC: 4 MMOL/L (ref 5–15)
AST SERPL-CCNC: 57 U/L (ref 1–32)
BILIRUB SERPL-MCNC: 0.3 MG/DL (ref 0–1.2)
BUN SERPL-MCNC: 11 MG/DL (ref 8–23)
BUN/CREAT SERPL: 14.9 (ref 7–25)
CALCIUM SPEC-SCNC: 9.1 MG/DL (ref 8.6–10.5)
CHLORIDE SERPL-SCNC: 101 MMOL/L (ref 98–107)
CO2 SERPL-SCNC: 35 MMOL/L (ref 22–29)
CREAT SERPL-MCNC: 0.74 MG/DL (ref 0.57–1)
DEPRECATED RDW RBC AUTO: 45.6 FL (ref 37–54)
ERYTHROCYTE [DISTWIDTH] IN BLOOD BY AUTOMATED COUNT: 13.2 % (ref 12.3–15.4)
GFR SERPL CREATININE-BSD FRML MDRD: 79 ML/MIN/1.73
GLOBULIN UR ELPH-MCNC: 3.2 GM/DL
GLUCOSE SERPL-MCNC: 147 MG/DL (ref 65–99)
HCT VFR BLD AUTO: 37.7 % (ref 34–46.6)
HGB BLD-MCNC: 11.7 G/DL (ref 12–15.9)
MCH RBC QN AUTO: 29.3 PG (ref 26.6–33)
MCHC RBC AUTO-ENTMCNC: 31 G/DL (ref 31.5–35.7)
MCV RBC AUTO: 94.3 FL (ref 79–97)
PLATELET # BLD AUTO: 228 10*3/MM3 (ref 140–450)
PMV BLD AUTO: 9.6 FL (ref 6–12)
POTASSIUM SERPL-SCNC: 4.1 MMOL/L (ref 3.5–5.2)
PROT SERPL-MCNC: 7.1 G/DL (ref 6–8.5)
RBC # BLD AUTO: 4 10*6/MM3 (ref 3.77–5.28)
SODIUM SERPL-SCNC: 140 MMOL/L (ref 136–145)
WBC # BLD AUTO: 4.69 10*3/MM3 (ref 3.4–10.8)

## 2021-04-21 PROCEDURE — 80053 COMPREHEN METABOLIC PANEL: CPT

## 2021-04-21 PROCEDURE — 36415 COLL VENOUS BLD VENIPUNCTURE: CPT

## 2021-04-21 PROCEDURE — 85027 COMPLETE CBC AUTOMATED: CPT

## 2021-06-07 ENCOUNTER — TRANSCRIBE ORDERS (OUTPATIENT)
Dept: LAB | Facility: HOSPITAL | Age: 61
End: 2021-06-07

## 2021-06-07 ENCOUNTER — LAB (OUTPATIENT)
Dept: LAB | Facility: HOSPITAL | Age: 61
End: 2021-06-07

## 2021-06-07 DIAGNOSIS — M06.09 RHEUMATOID ARTHRITIS OF MULTIPLE SITES WITHOUT RHEUMATOID FACTOR (HCC): ICD-10-CM

## 2021-06-07 DIAGNOSIS — M79.7 SCAPULOHUMERAL FIBROSITIS: ICD-10-CM

## 2021-06-07 DIAGNOSIS — R76.12 NONSPECIFIC REACTION TO CELL MEDIATED IMMUNITY MEASUREMENT OF GAMMA INTERFERON ANTIGEN RESPONSE WITHOUT ACTIVE TUBERCULOSIS: Primary | ICD-10-CM

## 2021-06-07 DIAGNOSIS — R76.12 NONSPECIFIC REACTION TO CELL MEDIATED IMMUNITY MEASUREMENT OF GAMMA INTERFERON ANTIGEN RESPONSE WITHOUT ACTIVE TUBERCULOSIS: ICD-10-CM

## 2021-06-07 LAB
ALBUMIN SERPL-MCNC: 3.8 G/DL (ref 3.5–5.2)
ALBUMIN/GLOB SERPL: 1.2 G/DL
ALP SERPL-CCNC: 74 U/L (ref 39–117)
ALT SERPL W P-5'-P-CCNC: 23 U/L (ref 1–33)
ANION GAP SERPL CALCULATED.3IONS-SCNC: 6 MMOL/L (ref 5–15)
AST SERPL-CCNC: 35 U/L (ref 1–32)
BASOPHILS # BLD AUTO: 0.03 10*3/MM3 (ref 0–0.2)
BASOPHILS NFR BLD AUTO: 0.6 % (ref 0–1.5)
BILIRUB SERPL-MCNC: 0.4 MG/DL (ref 0–1.2)
BUN SERPL-MCNC: 16 MG/DL (ref 8–23)
BUN/CREAT SERPL: 18.4 (ref 7–25)
CALCIUM SPEC-SCNC: 9.4 MG/DL (ref 8.6–10.5)
CHLORIDE SERPL-SCNC: 103 MMOL/L (ref 98–107)
CO2 SERPL-SCNC: 33 MMOL/L (ref 22–29)
CREAT SERPL-MCNC: 0.87 MG/DL (ref 0.57–1)
DEPRECATED RDW RBC AUTO: 45.9 FL (ref 37–54)
EOSINOPHIL # BLD AUTO: 0.29 10*3/MM3 (ref 0–0.4)
EOSINOPHIL NFR BLD AUTO: 5.7 % (ref 0.3–6.2)
ERYTHROCYTE [DISTWIDTH] IN BLOOD BY AUTOMATED COUNT: 13.5 % (ref 12.3–15.4)
GFR SERPL CREATININE-BSD FRML MDRD: 66 ML/MIN/1.73
GLOBULIN UR ELPH-MCNC: 3.2 GM/DL
GLUCOSE SERPL-MCNC: 88 MG/DL (ref 65–99)
HCT VFR BLD AUTO: 37 % (ref 34–46.6)
HGB BLD-MCNC: 11.7 G/DL (ref 12–15.9)
IMM GRANULOCYTES # BLD AUTO: 0.01 10*3/MM3 (ref 0–0.05)
IMM GRANULOCYTES NFR BLD AUTO: 0.2 % (ref 0–0.5)
LYMPHOCYTES # BLD AUTO: 1.48 10*3/MM3 (ref 0.7–3.1)
LYMPHOCYTES NFR BLD AUTO: 29 % (ref 19.6–45.3)
MCH RBC QN AUTO: 29 PG (ref 26.6–33)
MCHC RBC AUTO-ENTMCNC: 31.6 G/DL (ref 31.5–35.7)
MCV RBC AUTO: 91.6 FL (ref 79–97)
MONOCYTES # BLD AUTO: 0.46 10*3/MM3 (ref 0.1–0.9)
MONOCYTES NFR BLD AUTO: 9 % (ref 5–12)
NEUTROPHILS NFR BLD AUTO: 2.84 10*3/MM3 (ref 1.7–7)
NEUTROPHILS NFR BLD AUTO: 55.5 % (ref 42.7–76)
NRBC BLD AUTO-RTO: 0 /100 WBC (ref 0–0.2)
PLATELET # BLD AUTO: 180 10*3/MM3 (ref 140–450)
PMV BLD AUTO: 9.3 FL (ref 6–12)
POTASSIUM SERPL-SCNC: 4 MMOL/L (ref 3.5–5.2)
PROT SERPL-MCNC: 7 G/DL (ref 6–8.5)
RBC # BLD AUTO: 4.04 10*6/MM3 (ref 3.77–5.28)
SODIUM SERPL-SCNC: 142 MMOL/L (ref 136–145)
WBC # BLD AUTO: 5.11 10*3/MM3 (ref 3.4–10.8)

## 2021-06-07 PROCEDURE — 36415 COLL VENOUS BLD VENIPUNCTURE: CPT | Performed by: INTERNAL MEDICINE

## 2021-06-07 PROCEDURE — 85025 COMPLETE CBC W/AUTO DIFF WBC: CPT

## 2021-06-07 PROCEDURE — 80053 COMPREHEN METABOLIC PANEL: CPT | Performed by: INTERNAL MEDICINE

## 2021-07-19 ENCOUNTER — TRANSCRIBE ORDERS (OUTPATIENT)
Dept: LAB | Facility: HOSPITAL | Age: 61
End: 2021-07-19

## 2021-07-19 ENCOUNTER — LAB (OUTPATIENT)
Dept: LAB | Facility: HOSPITAL | Age: 61
End: 2021-07-19

## 2021-07-19 DIAGNOSIS — R76.12 NONSPECIFIC REACTION TO CELL MEDIATED IMMUNITY MEASUREMENT OF GAMMA INTERFERON ANTIGEN RESPONSE WITHOUT ACTIVE TUBERCULOSIS: ICD-10-CM

## 2021-07-19 DIAGNOSIS — M05.79 SEROPOSITIVE RHEUMATOID ARTHRITIS OF MULTIPLE SITES (HCC): ICD-10-CM

## 2021-07-19 DIAGNOSIS — M79.7 SCAPULOHUMERAL FIBROSITIS: ICD-10-CM

## 2021-07-19 DIAGNOSIS — R76.12 NONSPECIFIC REACTION TO CELL MEDIATED IMMUNITY MEASUREMENT OF GAMMA INTERFERON ANTIGEN RESPONSE WITHOUT ACTIVE TUBERCULOSIS: Primary | ICD-10-CM

## 2021-07-19 LAB
ALBUMIN SERPL-MCNC: 4.1 G/DL (ref 3.5–5.2)
ALBUMIN/GLOB SERPL: 1.3 G/DL
ALP SERPL-CCNC: 70 U/L (ref 39–117)
ALT SERPL W P-5'-P-CCNC: 36 U/L (ref 1–33)
ANION GAP SERPL CALCULATED.3IONS-SCNC: 8 MMOL/L (ref 5–15)
AST SERPL-CCNC: 45 U/L (ref 1–32)
BILIRUB SERPL-MCNC: 0.4 MG/DL (ref 0–1.2)
BUN SERPL-MCNC: 16 MG/DL (ref 8–23)
BUN/CREAT SERPL: 21.3 (ref 7–25)
CALCIUM SPEC-SCNC: 9.3 MG/DL (ref 8.6–10.5)
CHLORIDE SERPL-SCNC: 100 MMOL/L (ref 98–107)
CO2 SERPL-SCNC: 30 MMOL/L (ref 22–29)
CREAT SERPL-MCNC: 0.75 MG/DL (ref 0.57–1)
DEPRECATED RDW RBC AUTO: 45.9 FL (ref 37–54)
ERYTHROCYTE [DISTWIDTH] IN BLOOD BY AUTOMATED COUNT: 13.4 % (ref 12.3–15.4)
GFR SERPL CREATININE-BSD FRML MDRD: 78 ML/MIN/1.73
GLOBULIN UR ELPH-MCNC: 3.1 GM/DL
GLUCOSE SERPL-MCNC: 171 MG/DL (ref 65–99)
HCT VFR BLD AUTO: 37.6 % (ref 34–46.6)
HGB BLD-MCNC: 12.1 G/DL (ref 12–15.9)
MCH RBC QN AUTO: 30.3 PG (ref 26.6–33)
MCHC RBC AUTO-ENTMCNC: 32.2 G/DL (ref 31.5–35.7)
MCV RBC AUTO: 94 FL (ref 79–97)
PLATELET # BLD AUTO: 200 10*3/MM3 (ref 140–450)
PMV BLD AUTO: 9.8 FL (ref 6–12)
POTASSIUM SERPL-SCNC: 3.8 MMOL/L (ref 3.5–5.2)
PROT SERPL-MCNC: 7.2 G/DL (ref 6–8.5)
RBC # BLD AUTO: 4 10*6/MM3 (ref 3.77–5.28)
SODIUM SERPL-SCNC: 138 MMOL/L (ref 136–145)
WBC # BLD AUTO: 6.44 10*3/MM3 (ref 3.4–10.8)

## 2021-07-19 PROCEDURE — 85027 COMPLETE CBC AUTOMATED: CPT

## 2021-07-19 PROCEDURE — 80053 COMPREHEN METABOLIC PANEL: CPT

## 2021-07-19 PROCEDURE — 36415 COLL VENOUS BLD VENIPUNCTURE: CPT

## 2021-08-16 ENCOUNTER — TELEPHONE (OUTPATIENT)
Dept: CARDIOLOGY | Facility: CLINIC | Age: 61
End: 2021-08-16

## 2021-08-16 NOTE — TELEPHONE ENCOUNTER
Dr. Moreno is requesting cardiac risk assessment for removal of spinal chord stimulator.     Last seen 6/2020 and was to f/u as needed.     Hx:  CP, SOA, and palps. Normal stress test 2019. Echo 2019, normal

## 2021-08-30 ENCOUNTER — TRANSCRIBE ORDERS (OUTPATIENT)
Dept: LAB | Facility: HOSPITAL | Age: 61
End: 2021-08-30

## 2021-08-30 ENCOUNTER — LAB (OUTPATIENT)
Dept: LAB | Facility: HOSPITAL | Age: 61
End: 2021-08-30

## 2021-08-30 DIAGNOSIS — M05.79 SEROPOSITIVE RHEUMATOID ARTHRITIS OF MULTIPLE SITES (HCC): ICD-10-CM

## 2021-08-30 DIAGNOSIS — R76.12 NONSPECIFIC REACTION TO CELL MEDIATED IMMUNITY MEASUREMENT OF GAMMA INTERFERON ANTIGEN RESPONSE WITHOUT ACTIVE TUBERCULOSIS: ICD-10-CM

## 2021-08-30 DIAGNOSIS — M06.09 RHEUMATOID ARTHRITIS OF MULTIPLE SITES WITHOUT RHEUMATOID FACTOR (HCC): ICD-10-CM

## 2021-08-30 DIAGNOSIS — R76.12 NONSPECIFIC REACTION TO CELL MEDIATED IMMUNITY MEASUREMENT OF GAMMA INTERFERON ANTIGEN RESPONSE WITHOUT ACTIVE TUBERCULOSIS: Primary | ICD-10-CM

## 2021-08-30 DIAGNOSIS — M79.7 SCAPULOHUMERAL FIBROSITIS: ICD-10-CM

## 2021-08-30 LAB
ALBUMIN SERPL-MCNC: 4 G/DL (ref 3.5–5.2)
ALBUMIN/GLOB SERPL: 1.1 G/DL
ALP SERPL-CCNC: 81 U/L (ref 39–117)
ALT SERPL W P-5'-P-CCNC: 14 U/L (ref 1–33)
ANION GAP SERPL CALCULATED.3IONS-SCNC: 10 MMOL/L (ref 5–15)
AST SERPL-CCNC: 26 U/L (ref 1–32)
BASOPHILS # BLD AUTO: 0.03 10*3/MM3 (ref 0–0.2)
BASOPHILS NFR BLD AUTO: 0.4 % (ref 0–1.5)
BILIRUB SERPL-MCNC: 0.3 MG/DL (ref 0–1.2)
BUN SERPL-MCNC: 18 MG/DL (ref 8–23)
BUN/CREAT SERPL: 23.4 (ref 7–25)
CALCIUM SPEC-SCNC: 9.3 MG/DL (ref 8.6–10.5)
CHLORIDE SERPL-SCNC: 103 MMOL/L (ref 98–107)
CO2 SERPL-SCNC: 29 MMOL/L (ref 22–29)
CREAT SERPL-MCNC: 0.77 MG/DL (ref 0.57–1)
DEPRECATED RDW RBC AUTO: 44.8 FL (ref 37–54)
EOSINOPHIL # BLD AUTO: 0.17 10*3/MM3 (ref 0–0.4)
EOSINOPHIL NFR BLD AUTO: 2.2 % (ref 0.3–6.2)
ERYTHROCYTE [DISTWIDTH] IN BLOOD BY AUTOMATED COUNT: 13 % (ref 12.3–15.4)
GFR SERPL CREATININE-BSD FRML MDRD: 76 ML/MIN/1.73
GLOBULIN UR ELPH-MCNC: 3.5 GM/DL
GLUCOSE SERPL-MCNC: 96 MG/DL (ref 65–99)
HCT VFR BLD AUTO: 35.4 % (ref 34–46.6)
HGB BLD-MCNC: 11.1 G/DL (ref 12–15.9)
IMM GRANULOCYTES # BLD AUTO: 0.03 10*3/MM3 (ref 0–0.05)
IMM GRANULOCYTES NFR BLD AUTO: 0.4 % (ref 0–0.5)
LYMPHOCYTES # BLD AUTO: 2.37 10*3/MM3 (ref 0.7–3.1)
LYMPHOCYTES NFR BLD AUTO: 30.8 % (ref 19.6–45.3)
MCH RBC QN AUTO: 29.8 PG (ref 26.6–33)
MCHC RBC AUTO-ENTMCNC: 31.4 G/DL (ref 31.5–35.7)
MCV RBC AUTO: 94.9 FL (ref 79–97)
MONOCYTES # BLD AUTO: 0.61 10*3/MM3 (ref 0.1–0.9)
MONOCYTES NFR BLD AUTO: 7.9 % (ref 5–12)
NEUTROPHILS NFR BLD AUTO: 4.48 10*3/MM3 (ref 1.7–7)
NEUTROPHILS NFR BLD AUTO: 58.3 % (ref 42.7–76)
NRBC BLD AUTO-RTO: 0 /100 WBC (ref 0–0.2)
PLATELET # BLD AUTO: 277 10*3/MM3 (ref 140–450)
PMV BLD AUTO: 8.9 FL (ref 6–12)
POTASSIUM SERPL-SCNC: 3.6 MMOL/L (ref 3.5–5.2)
PROT SERPL-MCNC: 7.5 G/DL (ref 6–8.5)
RBC # BLD AUTO: 3.73 10*6/MM3 (ref 3.77–5.28)
SODIUM SERPL-SCNC: 142 MMOL/L (ref 136–145)
WBC # BLD AUTO: 7.69 10*3/MM3 (ref 3.4–10.8)

## 2021-08-30 PROCEDURE — 36415 COLL VENOUS BLD VENIPUNCTURE: CPT

## 2021-08-30 PROCEDURE — 85025 COMPLETE CBC W/AUTO DIFF WBC: CPT

## 2021-08-30 PROCEDURE — 80053 COMPREHEN METABOLIC PANEL: CPT

## 2021-09-14 ENCOUNTER — OFFICE VISIT (OUTPATIENT)
Dept: CARDIOLOGY | Facility: CLINIC | Age: 61
End: 2021-09-14

## 2021-09-14 VITALS
BODY MASS INDEX: 23.04 KG/M2 | DIASTOLIC BLOOD PRESSURE: 62 MMHG | HEIGHT: 68 IN | HEART RATE: 67 BPM | WEIGHT: 152 LBS | SYSTOLIC BLOOD PRESSURE: 110 MMHG | OXYGEN SATURATION: 97 %

## 2021-09-14 DIAGNOSIS — R07.89 CHEST PAIN, ATYPICAL: Primary | ICD-10-CM

## 2021-09-14 PROCEDURE — 99213 OFFICE O/P EST LOW 20 MIN: CPT | Performed by: PHYSICIAN ASSISTANT

## 2021-09-14 PROCEDURE — 93000 ELECTROCARDIOGRAM COMPLETE: CPT | Performed by: PHYSICIAN ASSISTANT

## 2021-09-14 RX ORDER — ISONIAZID 300 MG/1
TABLET ORAL
COMMUNITY

## 2021-09-14 RX ORDER — GABAPENTIN 600 MG/1
TABLET ORAL
COMMUNITY
Start: 2021-06-24

## 2021-09-14 RX ORDER — ALENDRONATE SODIUM 70 MG/1
TABLET ORAL
COMMUNITY

## 2021-09-14 NOTE — PROGRESS NOTES
[facility] at [facility]   OFFICE NOTE      Lisa Mendez  1960  PCP: Pan Carrion MD    SUBJECTIVE:   Lisa Mendez is a 61 y.o. female seen for a follow up visit regarding the following:     CC:Palpitations    HPI:   Pleasant 61 year old female patient of Dr Hermosillo presents today for follow up regarding palpitations and recent visit to [facility] 8/22/2021 for episode of Chest pain. She had a negative work up with normal EKG, Cardiac markers, and Normal CTA. She has not had any recurrent events. She has not had  any sustained palpitation episodes, SOB, CP, LH, and dizziness. Denies any  bleeding, or TIA/CVA symptoms. Overall feels well.    Cardiac PMH: (Old records have been reviewed and summarized below)  1. Chest pain:  a. MPS, 09/17/2019: No evidence of ischemia. EF 74%.  b. Eco EF 55%, no VHD 2019  c. ER [place] 8/22/2021 atypical chest pain, negative cardiac markers, normal CTA.   2. Shortness of breath:  a. CXR, 07/23/2019: No acute cardiopulmonary disease.  b. CTA of chest, 07/2019: No evidence of PE. No evidence of aortic dissection.  3. Palpitations:  a. Cardiac event monitor, 09/18/2019, 30 days: SR/PAC/PVC.  No atrial fibrillation  b. Echo, 12/05/2019: EF 55%. Mild MR/TR. RVSP 29 mmHg.  4. Sleep apnea  5. History of DVT  6. GERD  7. Fibromyalgia  8. Rheumatoid arthritis  9. Surgeries:  a. Hysterectomy  b. Lumbar fusion L4-L5 with spinal cord stimulator  c. Bariatric surgery    Past Medical History, Past Surgical History, Family history, Social History, and Medications were all reviewed with the patient today and updated as necessary.       Current Outpatient Medications:   •  alendronate (FOSAMAX) 70 MG tablet, alendronate 70 mg tablet, Disp: , Rfl:   •  ALPRAZolam (XANAX) 0.5 MG tablet, Daily., Disp: , Rfl:   •  CALCIUM-VITAMIN D PO, Take  by mouth 2 (Two) Times a Day., Disp: , Rfl:   •  cholecalciferol (VITAMIN D3) 1000 units tablet, Take 5,000 Units by  mouth Daily., Disp: , Rfl:   •  Cyanocobalamin (VITAMIN B-12) 3000 MCG sublingual tablet, Place  under the tongue 3 (Three) Times a Week., Disp: , Rfl:   •  famotidine (PEPCID) 20 MG tablet, Take 1 tablet by mouth Daily., Disp: 30 tablet, Rfl: 11  •  FERROUS GLUCONATE PO, Take 324 mg by mouth Daily., Disp: , Rfl:   •  FLUoxetine (PROzac) 40 MG capsule, Take 40 mg by mouth Daily., Disp: , Rfl:   •  gabapentin (NEURONTIN) 300 MG capsule, Take 300 mg by mouth 3 (Three) Times a Day., Disp: , Rfl:   •  gabapentin (NEURONTIN) 600 MG tablet, , Disp: , Rfl:   •  glucosamine sulfate 500 MG capsule capsule, Take 500 mg by mouth 3 (Three) Times a Day With Meals., Disp: , Rfl:   •  hydroxychloroquine (PLAQUENIL) 200 MG tablet, Take 200 mg by mouth 2 (Two) Times a Day., Disp: , Rfl:   •  isoniazid (NYDRAZID) 300 MG tablet, isoniazid 300 mg tablet, Disp: , Rfl:   •  LORazepam (ATIVAN) 1 MG tablet, Take 0.5 mg by mouth Daily., Disp: , Rfl:   •  Multiple Vitamins-Minerals (MULTIVITAMIN ADULT PO), Take 1 tablet by mouth 2 (Two) Times a Day., Disp: , Rfl:   •  Omega-3 Fatty Acids (OMEGA 3 PO), Take 1 capsule by mouth 2 (Two) Times a Day., Disp: , Rfl:   •  vitamin E 400 UNIT capsule, Take 800 Units by mouth Daily., Disp: , Rfl:       Allergies   Allergen Reactions   • Clindamycin/Lincomycin Other (See Comments)     Degenerative disc disease      • Darvon [Propoxyphene] Other (See Comments)     Caused Rheumatoid arthritis     • Doxycycline Other (See Comments)     Caused diverticulitis     • Levofloxacin Other (See Comments)     Caused Ulcers    • Rifampin Other (See Comments)     Cause Acid reflux     • Septra [Sulfamethoxazole-Trimethoprim] Other (See Comments)     Caused Fibromyalgia      • Tramadol Other (See Comments)     Caused Chronic nerve damage     • Ultram [Tramadol Hcl] Other (See Comments)     Sleep apnea         ROS:  Review of Symptoms:  General: no recent weight loss/gain, weakness or fatigue  Skin: no rashes, lumps,  "or other skin changes  HEENT: no dizziness, lightheadedness, or vision changes  Respiratory: no cough or hemoptysis  Cardiovascular: no palpitations, and tachycardia  Gastrointestinal: no black/tarry stools or diarrhea  Urinary: no change in frequency or urgency  Peripheral Vascular: no claudication or leg cramps  Musculoskeletal: no muscle or joint pain/stiffness  Psychiatric: no depression or excessive stress  Neurological: no sensory or motor loss, no syncope  Hematologic: no anemia, easy bruising or bleeding  Endocrine: no thyroid problems, nor heat or cold intolerance    PHYSICAL EXAM:    /62 (BP Location: Left arm, Patient Position: Sitting)   Pulse 67   Ht 172.7 cm (68\")   Wt 68.9 kg (152 lb)   SpO2 97%   BMI 23.11 kg/m²        Wt Readings from Last 5 Encounters:   09/14/21 68.9 kg (152 lb)   06/11/20 69.4 kg (153 lb)   12/05/19 69.9 kg (154 lb)   10/31/19 69.9 kg (154 lb)   09/18/19 68.9 kg (151 lb 12.8 oz)       BP Readings from Last 5 Encounters:   09/14/21 110/62   06/11/20 101/58   10/31/19 114/60   09/18/19 110/60   08/20/19 114/72       General appearance - Alert, well appearing, and in no distress   Mental status - Affect appropriate to mood.  Eyes - Sclerae anicteric,  ENMT - Hearing grossly normal bilaterally, Dental hygiene good.  Neck - Carotids upstroke normal bilaterally, no bruits, no JVD.  Resp - Clear to auscultation, no wheezes, rales or rhonchi, symmetric air entry.  Heart - Normal rate, regular rhythm, normal S1, S2, no murmurs, rubs, clicks or gallops.  GI - Soft, nontender, nondistended, no masses or organomegaly.  Neurological - Grossly intact - normal speech, no focal findings  Musculoskeletal - No joint tenderness, deformity or swelling, no muscular tenderness noted.  Extremities - Peripheral pulses normal, no pedal edema, no clubbing or cyanosis.  Skin - Normal coloration and turgor.  Psych -  oriented to person, place, and time.    Medical problems and test results were " reviewed with the patient today.     Recent Results (from the past 672 hour(s))   Comprehensive Metabolic Panel    Collection Time: 08/30/21  9:54 AM    Specimen: Blood   Result Value Ref Range    Glucose 96 65 - 99 mg/dL    BUN 18 8 - 23 mg/dL    Creatinine 0.77 0.57 - 1.00 mg/dL    Sodium 142 136 - 145 mmol/L    Potassium 3.6 3.5 - 5.2 mmol/L    Chloride 103 98 - 107 mmol/L    CO2 29.0 22.0 - 29.0 mmol/L    Calcium 9.3 8.6 - 10.5 mg/dL    Total Protein 7.5 6.0 - 8.5 g/dL    Albumin 4.00 3.50 - 5.20 g/dL    ALT (SGPT) 14 1 - 33 U/L    AST (SGOT) 26 1 - 32 U/L    Alkaline Phosphatase 81 39 - 117 U/L    Total Bilirubin 0.3 0.0 - 1.2 mg/dL    eGFR Non African Amer 76 >60 mL/min/1.73    Globulin 3.5 gm/dL    A/G Ratio 1.1 g/dL    BUN/Creatinine Ratio 23.4 7.0 - 25.0    Anion Gap 10.0 5.0 - 15.0 mmol/L   CBC Auto Differential    Collection Time: 08/30/21  9:54 AM    Specimen: Blood   Result Value Ref Range    WBC 7.69 3.40 - 10.80 10*3/mm3    RBC 3.73 (L) 3.77 - 5.28 10*6/mm3    Hemoglobin 11.1 (L) 12.0 - 15.9 g/dL    Hematocrit 35.4 34.0 - 46.6 %    MCV 94.9 79.0 - 97.0 fL    MCH 29.8 26.6 - 33.0 pg    MCHC 31.4 (L) 31.5 - 35.7 g/dL    RDW 13.0 12.3 - 15.4 %    RDW-SD 44.8 37.0 - 54.0 fl    MPV 8.9 6.0 - 12.0 fL    Platelets 277 140 - 450 10*3/mm3    Neutrophil % 58.3 42.7 - 76.0 %    Lymphocyte % 30.8 19.6 - 45.3 %    Monocyte % 7.9 5.0 - 12.0 %    Eosinophil % 2.2 0.3 - 6.2 %    Basophil % 0.4 0.0 - 1.5 %    Immature Grans % 0.4 0.0 - 0.5 %    Neutrophils, Absolute 4.48 1.70 - 7.00 10*3/mm3    Lymphocytes, Absolute 2.37 0.70 - 3.10 10*3/mm3    Monocytes, Absolute 0.61 0.10 - 0.90 10*3/mm3    Eosinophils, Absolute 0.17 0.00 - 0.40 10*3/mm3    Basophils, Absolute 0.03 0.00 - 0.20 10*3/mm3    Immature Grans, Absolute 0.03 0.00 - 0.05 10*3/mm3    nRBC 0.0 0.0 - 0.2 /100 WBC         EKG: (EKG has been independently visualized by me and summarized below)    ECG 12 Lead    Date/Time: 9/14/2021 9:50 AM  Performed by:  Joe Bey PA  Authorized by: Joe Bey PA   Rhythm: sinus rhythm  Rate: normal  Conduction: conduction normal  ST Segments: ST segments normal  T Waves: T waves normal  QRS axis: normal  Other: no other findings    Clinical impression: normal ECG              ASSESSMENT   1. Palpitations-Stable, no recurrent events  2. Atypical chest pain, Negative work up with [facility]. No recurrent events. Negative previous stress test and Echo 2019  3. Severe Anxiety, Fibromyalgia       PLAN  · Stable CV course, continue to focus on risk factors.   · Follow up 8 months or sooner as needed.       9/14/2021  09:51 EDT     Will Maida BUTTERFIELD

## 2021-10-13 ENCOUNTER — LAB (OUTPATIENT)
Dept: LAB | Facility: HOSPITAL | Age: 61
End: 2021-10-13

## 2021-10-13 ENCOUNTER — TRANSCRIBE ORDERS (OUTPATIENT)
Dept: LAB | Facility: HOSPITAL | Age: 61
End: 2021-10-13

## 2021-10-13 DIAGNOSIS — T85.733D INFECTION OF SPINAL CORD STIMULATOR, SUBSEQUENT ENCOUNTER: ICD-10-CM

## 2021-10-13 DIAGNOSIS — T85.733D INFECTION OF SPINAL CORD STIMULATOR, SUBSEQUENT ENCOUNTER: Primary | ICD-10-CM

## 2021-10-13 LAB
ALBUMIN SERPL-MCNC: 3.9 G/DL (ref 3.5–5.2)
ALBUMIN/GLOB SERPL: 1.1 G/DL
ALP SERPL-CCNC: 76 U/L (ref 39–117)
ALT SERPL W P-5'-P-CCNC: 17 U/L (ref 1–33)
ANION GAP SERPL CALCULATED.3IONS-SCNC: 7 MMOL/L (ref 5–15)
AST SERPL-CCNC: 35 U/L (ref 1–32)
BASOPHILS # BLD AUTO: 0.02 10*3/MM3 (ref 0–0.2)
BASOPHILS NFR BLD AUTO: 0.3 % (ref 0–1.5)
BILIRUB SERPL-MCNC: 0.2 MG/DL (ref 0–1.2)
BUN SERPL-MCNC: 9 MG/DL (ref 8–23)
BUN/CREAT SERPL: 12.3 (ref 7–25)
CALCIUM SPEC-SCNC: 9.2 MG/DL (ref 8.6–10.5)
CHLORIDE SERPL-SCNC: 101 MMOL/L (ref 98–107)
CK SERPL-CCNC: 64 U/L (ref 20–180)
CO2 SERPL-SCNC: 31 MMOL/L (ref 22–29)
CREAT SERPL-MCNC: 0.73 MG/DL (ref 0.57–1)
CRP SERPL-MCNC: 0.33 MG/DL (ref 0–0.5)
DEPRECATED RDW RBC AUTO: 44.9 FL (ref 37–54)
EOSINOPHIL # BLD AUTO: 0.39 10*3/MM3 (ref 0–0.4)
EOSINOPHIL NFR BLD AUTO: 5.4 % (ref 0.3–6.2)
ERYTHROCYTE [DISTWIDTH] IN BLOOD BY AUTOMATED COUNT: 13.2 % (ref 12.3–15.4)
ERYTHROCYTE [SEDIMENTATION RATE] IN BLOOD: 43 MM/HR (ref 0–30)
GFR SERPL CREATININE-BSD FRML MDRD: 81 ML/MIN/1.73
GLOBULIN UR ELPH-MCNC: 3.6 GM/DL
GLUCOSE SERPL-MCNC: 107 MG/DL (ref 65–99)
HCT VFR BLD AUTO: 34.8 % (ref 34–46.6)
HGB BLD-MCNC: 10.9 G/DL (ref 12–15.9)
IMM GRANULOCYTES # BLD AUTO: 0.02 10*3/MM3 (ref 0–0.05)
IMM GRANULOCYTES NFR BLD AUTO: 0.3 % (ref 0–0.5)
LYMPHOCYTES # BLD AUTO: 1.81 10*3/MM3 (ref 0.7–3.1)
LYMPHOCYTES NFR BLD AUTO: 25.3 % (ref 19.6–45.3)
MCH RBC QN AUTO: 28.9 PG (ref 26.6–33)
MCHC RBC AUTO-ENTMCNC: 31.3 G/DL (ref 31.5–35.7)
MCV RBC AUTO: 92.3 FL (ref 79–97)
MONOCYTES # BLD AUTO: 0.54 10*3/MM3 (ref 0.1–0.9)
MONOCYTES NFR BLD AUTO: 7.5 % (ref 5–12)
NEUTROPHILS NFR BLD AUTO: 4.38 10*3/MM3 (ref 1.7–7)
NEUTROPHILS NFR BLD AUTO: 61.2 % (ref 42.7–76)
NRBC BLD AUTO-RTO: 0 /100 WBC (ref 0–0.2)
PLATELET # BLD AUTO: 332 10*3/MM3 (ref 140–450)
PMV BLD AUTO: 9.1 FL (ref 6–12)
POTASSIUM SERPL-SCNC: 3.8 MMOL/L (ref 3.5–5.2)
PROT SERPL-MCNC: 7.5 G/DL (ref 6–8.5)
RBC # BLD AUTO: 3.77 10*6/MM3 (ref 3.77–5.28)
SODIUM SERPL-SCNC: 139 MMOL/L (ref 136–145)
WBC # BLD AUTO: 7.16 10*3/MM3 (ref 3.4–10.8)

## 2021-10-13 PROCEDURE — 82550 ASSAY OF CK (CPK): CPT

## 2021-10-13 PROCEDURE — 86140 C-REACTIVE PROTEIN: CPT

## 2021-10-13 PROCEDURE — 85652 RBC SED RATE AUTOMATED: CPT

## 2021-10-13 PROCEDURE — 85025 COMPLETE CBC W/AUTO DIFF WBC: CPT

## 2021-10-13 PROCEDURE — 80053 COMPREHEN METABOLIC PANEL: CPT

## 2021-10-13 PROCEDURE — 36415 COLL VENOUS BLD VENIPUNCTURE: CPT

## 2021-10-20 ENCOUNTER — LAB (OUTPATIENT)
Dept: LAB | Facility: HOSPITAL | Age: 61
End: 2021-10-20

## 2021-10-20 ENCOUNTER — TRANSCRIBE ORDERS (OUTPATIENT)
Dept: LAB | Facility: HOSPITAL | Age: 61
End: 2021-10-20

## 2021-10-20 DIAGNOSIS — T85.733D INFECTION OF SPINAL CORD STIMULATOR, SUBSEQUENT ENCOUNTER: Primary | ICD-10-CM

## 2021-10-20 DIAGNOSIS — T85.733D INFECTION OF SPINAL CORD STIMULATOR, SUBSEQUENT ENCOUNTER: ICD-10-CM

## 2021-10-20 LAB
ALBUMIN SERPL-MCNC: 3.9 G/DL (ref 3.5–5.2)
ALBUMIN/GLOB SERPL: 1.1 G/DL
ALP SERPL-CCNC: 78 U/L (ref 39–117)
ALT SERPL W P-5'-P-CCNC: 12 U/L (ref 1–33)
ANION GAP SERPL CALCULATED.3IONS-SCNC: 8 MMOL/L (ref 5–15)
AST SERPL-CCNC: 25 U/L (ref 1–32)
BASOPHILS # BLD AUTO: 0.04 10*3/MM3 (ref 0–0.2)
BASOPHILS NFR BLD AUTO: 0.7 % (ref 0–1.5)
BILIRUB SERPL-MCNC: 0.3 MG/DL (ref 0–1.2)
BUN SERPL-MCNC: 13 MG/DL (ref 8–23)
BUN/CREAT SERPL: 14.8 (ref 7–25)
CALCIUM SPEC-SCNC: 9.4 MG/DL (ref 8.6–10.5)
CHLORIDE SERPL-SCNC: 102 MMOL/L (ref 98–107)
CK SERPL-CCNC: 66 U/L (ref 20–180)
CO2 SERPL-SCNC: 31 MMOL/L (ref 22–29)
CREAT SERPL-MCNC: 0.88 MG/DL (ref 0.57–1)
CRP SERPL-MCNC: <0.3 MG/DL (ref 0–0.5)
DEPRECATED RDW RBC AUTO: 44.3 FL (ref 37–54)
EOSINOPHIL # BLD AUTO: 0.43 10*3/MM3 (ref 0–0.4)
EOSINOPHIL NFR BLD AUTO: 7.6 % (ref 0.3–6.2)
ERYTHROCYTE [DISTWIDTH] IN BLOOD BY AUTOMATED COUNT: 13.2 % (ref 12.3–15.4)
ERYTHROCYTE [SEDIMENTATION RATE] IN BLOOD: 51 MM/HR (ref 0–30)
GFR SERPL CREATININE-BSD FRML MDRD: 65 ML/MIN/1.73
GLOBULIN UR ELPH-MCNC: 3.5 GM/DL
GLUCOSE SERPL-MCNC: 108 MG/DL (ref 65–99)
HCT VFR BLD AUTO: 33.9 % (ref 34–46.6)
HGB BLD-MCNC: 10.8 G/DL (ref 12–15.9)
IMM GRANULOCYTES # BLD AUTO: 0.01 10*3/MM3 (ref 0–0.05)
IMM GRANULOCYTES NFR BLD AUTO: 0.2 % (ref 0–0.5)
LYMPHOCYTES # BLD AUTO: 1.6 10*3/MM3 (ref 0.7–3.1)
LYMPHOCYTES NFR BLD AUTO: 28.1 % (ref 19.6–45.3)
MCH RBC QN AUTO: 29.3 PG (ref 26.6–33)
MCHC RBC AUTO-ENTMCNC: 31.9 G/DL (ref 31.5–35.7)
MCV RBC AUTO: 91.9 FL (ref 79–97)
MONOCYTES # BLD AUTO: 0.42 10*3/MM3 (ref 0.1–0.9)
MONOCYTES NFR BLD AUTO: 7.4 % (ref 5–12)
NEUTROPHILS NFR BLD AUTO: 3.19 10*3/MM3 (ref 1.7–7)
NEUTROPHILS NFR BLD AUTO: 56 % (ref 42.7–76)
NRBC BLD AUTO-RTO: 0 /100 WBC (ref 0–0.2)
PLATELET # BLD AUTO: 311 10*3/MM3 (ref 140–450)
PMV BLD AUTO: 9.4 FL (ref 6–12)
POTASSIUM SERPL-SCNC: 4.8 MMOL/L (ref 3.5–5.2)
PROT SERPL-MCNC: 7.4 G/DL (ref 6–8.5)
RBC # BLD AUTO: 3.69 10*6/MM3 (ref 3.77–5.28)
SODIUM SERPL-SCNC: 141 MMOL/L (ref 136–145)
WBC # BLD AUTO: 5.69 10*3/MM3 (ref 3.4–10.8)

## 2021-10-20 PROCEDURE — 80053 COMPREHEN METABOLIC PANEL: CPT

## 2021-10-20 PROCEDURE — 85025 COMPLETE CBC W/AUTO DIFF WBC: CPT

## 2021-10-20 PROCEDURE — 36415 COLL VENOUS BLD VENIPUNCTURE: CPT

## 2021-10-20 PROCEDURE — 86140 C-REACTIVE PROTEIN: CPT

## 2021-10-20 PROCEDURE — 85652 RBC SED RATE AUTOMATED: CPT

## 2021-10-20 PROCEDURE — 82550 ASSAY OF CK (CPK): CPT

## 2021-10-27 ENCOUNTER — LAB (OUTPATIENT)
Dept: LAB | Facility: HOSPITAL | Age: 61
End: 2021-10-27

## 2021-10-27 ENCOUNTER — TRANSCRIBE ORDERS (OUTPATIENT)
Dept: LAB | Facility: HOSPITAL | Age: 61
End: 2021-10-27

## 2021-10-27 DIAGNOSIS — T85.733D INFECTION OF SPINAL CORD STIMULATOR, SUBSEQUENT ENCOUNTER: Primary | ICD-10-CM

## 2021-10-27 DIAGNOSIS — T85.733D INFECTION OF SPINAL CORD STIMULATOR, SUBSEQUENT ENCOUNTER: ICD-10-CM

## 2021-10-27 LAB
ALBUMIN SERPL-MCNC: 4 G/DL (ref 3.5–5.2)
ALBUMIN/GLOB SERPL: 1.2 G/DL
ALP SERPL-CCNC: 80 U/L (ref 39–117)
ALT SERPL W P-5'-P-CCNC: 23 U/L (ref 1–33)
ANION GAP SERPL CALCULATED.3IONS-SCNC: 8 MMOL/L (ref 5–15)
AST SERPL-CCNC: 45 U/L (ref 1–32)
BASOPHILS # BLD AUTO: 0.04 10*3/MM3 (ref 0–0.2)
BASOPHILS NFR BLD AUTO: 0.7 % (ref 0–1.5)
BILIRUB SERPL-MCNC: 0.3 MG/DL (ref 0–1.2)
BUN SERPL-MCNC: 10 MG/DL (ref 8–23)
BUN/CREAT SERPL: 13.3 (ref 7–25)
CALCIUM SPEC-SCNC: 9.2 MG/DL (ref 8.6–10.5)
CHLORIDE SERPL-SCNC: 100 MMOL/L (ref 98–107)
CK SERPL-CCNC: 83 U/L (ref 20–180)
CO2 SERPL-SCNC: 31 MMOL/L (ref 22–29)
CREAT SERPL-MCNC: 0.75 MG/DL (ref 0.57–1)
CRP SERPL-MCNC: <0.3 MG/DL (ref 0–0.5)
DEPRECATED RDW RBC AUTO: 42.8 FL (ref 37–54)
EOSINOPHIL # BLD AUTO: 0.46 10*3/MM3 (ref 0–0.4)
EOSINOPHIL NFR BLD AUTO: 8.2 % (ref 0.3–6.2)
ERYTHROCYTE [DISTWIDTH] IN BLOOD BY AUTOMATED COUNT: 12.8 % (ref 12.3–15.4)
ERYTHROCYTE [SEDIMENTATION RATE] IN BLOOD: 43 MM/HR (ref 0–30)
GFR SERPL CREATININE-BSD FRML MDRD: 78 ML/MIN/1.73
GLOBULIN UR ELPH-MCNC: 3.4 GM/DL
GLUCOSE SERPL-MCNC: 107 MG/DL (ref 65–99)
HCT VFR BLD AUTO: 34.6 % (ref 34–46.6)
HGB BLD-MCNC: 11.1 G/DL (ref 12–15.9)
IMM GRANULOCYTES # BLD AUTO: 0.01 10*3/MM3 (ref 0–0.05)
IMM GRANULOCYTES NFR BLD AUTO: 0.2 % (ref 0–0.5)
LYMPHOCYTES # BLD AUTO: 1.97 10*3/MM3 (ref 0.7–3.1)
LYMPHOCYTES NFR BLD AUTO: 35.1 % (ref 19.6–45.3)
MCH RBC QN AUTO: 29.4 PG (ref 26.6–33)
MCHC RBC AUTO-ENTMCNC: 32.1 G/DL (ref 31.5–35.7)
MCV RBC AUTO: 91.5 FL (ref 79–97)
MONOCYTES # BLD AUTO: 0.47 10*3/MM3 (ref 0.1–0.9)
MONOCYTES NFR BLD AUTO: 8.4 % (ref 5–12)
NEUTROPHILS NFR BLD AUTO: 2.67 10*3/MM3 (ref 1.7–7)
NEUTROPHILS NFR BLD AUTO: 47.4 % (ref 42.7–76)
NRBC BLD AUTO-RTO: 0 /100 WBC (ref 0–0.2)
PLAT MORPH BLD: NORMAL
PLATELET # BLD AUTO: 221 10*3/MM3 (ref 140–450)
PMV BLD AUTO: 9.4 FL (ref 6–12)
POTASSIUM SERPL-SCNC: 4.5 MMOL/L (ref 3.5–5.2)
PROT SERPL-MCNC: 7.4 G/DL (ref 6–8.5)
RBC # BLD AUTO: 3.78 10*6/MM3 (ref 3.77–5.28)
RBC MORPH BLD: NORMAL
SODIUM SERPL-SCNC: 139 MMOL/L (ref 136–145)
WBC # BLD AUTO: 5.62 10*3/MM3 (ref 3.4–10.8)
WBC MORPH BLD: NORMAL

## 2021-10-27 PROCEDURE — 82550 ASSAY OF CK (CPK): CPT

## 2021-10-27 PROCEDURE — 85007 BL SMEAR W/DIFF WBC COUNT: CPT

## 2021-10-27 PROCEDURE — 85025 COMPLETE CBC W/AUTO DIFF WBC: CPT

## 2021-10-27 PROCEDURE — 36415 COLL VENOUS BLD VENIPUNCTURE: CPT

## 2021-10-27 PROCEDURE — 86140 C-REACTIVE PROTEIN: CPT

## 2021-10-27 PROCEDURE — 80053 COMPREHEN METABOLIC PANEL: CPT

## 2021-10-27 PROCEDURE — 85652 RBC SED RATE AUTOMATED: CPT

## 2021-11-03 ENCOUNTER — LAB (OUTPATIENT)
Dept: LAB | Facility: HOSPITAL | Age: 61
End: 2021-11-03

## 2021-11-03 ENCOUNTER — TRANSCRIBE ORDERS (OUTPATIENT)
Dept: LAB | Facility: HOSPITAL | Age: 61
End: 2021-11-03

## 2021-11-03 DIAGNOSIS — T85.733D INFECTION OF SPINAL CORD STIMULATOR, SUBSEQUENT ENCOUNTER: Primary | ICD-10-CM

## 2021-11-03 LAB
ALBUMIN SERPL-MCNC: 3.9 G/DL (ref 3.5–5.2)
ALBUMIN/GLOB SERPL: 1.2 G/DL
ALP SERPL-CCNC: 79 U/L (ref 39–117)
ALT SERPL W P-5'-P-CCNC: 16 U/L (ref 1–33)
ANION GAP SERPL CALCULATED.3IONS-SCNC: 7 MMOL/L (ref 5–15)
AST SERPL-CCNC: 31 U/L (ref 1–32)
BASOPHILS # BLD MANUAL: 0 10*3/MM3 (ref 0–0.2)
BASOPHILS NFR BLD AUTO: 0 % (ref 0–1.5)
BILIRUB SERPL-MCNC: 0.2 MG/DL (ref 0–1.2)
BUN SERPL-MCNC: 8 MG/DL (ref 8–23)
BUN/CREAT SERPL: 10.5 (ref 7–25)
CALCIUM SPEC-SCNC: 8.6 MG/DL (ref 8.6–10.5)
CHLORIDE SERPL-SCNC: 101 MMOL/L (ref 98–107)
CK SERPL-CCNC: 98 U/L (ref 20–180)
CO2 SERPL-SCNC: 33 MMOL/L (ref 22–29)
CREAT SERPL-MCNC: 0.76 MG/DL (ref 0.57–1)
CRP SERPL-MCNC: <0.3 MG/DL (ref 0–0.5)
DEPRECATED RDW RBC AUTO: 43.8 FL (ref 37–54)
EOSINOPHIL # BLD MANUAL: 0.55 10*3/MM3 (ref 0–0.4)
EOSINOPHIL NFR BLD MANUAL: 10 % (ref 0.3–6.2)
ERYTHROCYTE [DISTWIDTH] IN BLOOD BY AUTOMATED COUNT: 13 % (ref 12.3–15.4)
ERYTHROCYTE [SEDIMENTATION RATE] IN BLOOD: 20 MM/HR (ref 0–30)
GFR SERPL CREATININE-BSD FRML MDRD: 77 ML/MIN/1.73
GLOBULIN UR ELPH-MCNC: 3.2 GM/DL
GLUCOSE SERPL-MCNC: 79 MG/DL (ref 65–99)
HCT VFR BLD AUTO: 33.1 % (ref 34–46.6)
HGB BLD-MCNC: 10.6 G/DL (ref 12–15.9)
LYMPHOCYTES # BLD MANUAL: 1.71 10*3/MM3 (ref 0.7–3.1)
LYMPHOCYTES NFR BLD MANUAL: 27 % (ref 19.6–45.3)
LYMPHOCYTES NFR BLD MANUAL: 8 % (ref 5–12)
MCH RBC QN AUTO: 29.4 PG (ref 26.6–33)
MCHC RBC AUTO-ENTMCNC: 32 G/DL (ref 31.5–35.7)
MCV RBC AUTO: 91.9 FL (ref 79–97)
MONOCYTES # BLD AUTO: 0.44 10*3/MM3 (ref 0.1–0.9)
NEUTROPHILS # BLD AUTO: 2.82 10*3/MM3 (ref 1.7–7)
NEUTROPHILS NFR BLD MANUAL: 51 % (ref 42.7–76)
PLAT MORPH BLD: NORMAL
PLATELET # BLD AUTO: 176 10*3/MM3 (ref 140–450)
PMV BLD AUTO: 9.5 FL (ref 6–12)
POTASSIUM SERPL-SCNC: 3.6 MMOL/L (ref 3.5–5.2)
PROT SERPL-MCNC: 7.1 G/DL (ref 6–8.5)
RBC # BLD AUTO: 3.6 10*6/MM3 (ref 3.77–5.28)
RBC MORPH BLD: NORMAL
SODIUM SERPL-SCNC: 141 MMOL/L (ref 136–145)
VARIANT LYMPHS NFR BLD MANUAL: 4 % (ref 0–5)
WBC # BLD AUTO: 5.53 10*3/MM3 (ref 3.4–10.8)
WBC MORPH BLD: NORMAL

## 2021-11-03 PROCEDURE — 86140 C-REACTIVE PROTEIN: CPT

## 2021-11-03 PROCEDURE — 85025 COMPLETE CBC W/AUTO DIFF WBC: CPT

## 2021-11-03 PROCEDURE — 82550 ASSAY OF CK (CPK): CPT

## 2021-11-03 PROCEDURE — 36415 COLL VENOUS BLD VENIPUNCTURE: CPT

## 2021-11-03 PROCEDURE — 80053 COMPREHEN METABOLIC PANEL: CPT

## 2021-11-03 PROCEDURE — 85007 BL SMEAR W/DIFF WBC COUNT: CPT

## 2021-11-03 PROCEDURE — 85652 RBC SED RATE AUTOMATED: CPT

## 2021-11-17 ENCOUNTER — LAB (OUTPATIENT)
Dept: LAB | Facility: HOSPITAL | Age: 61
End: 2021-11-17

## 2021-11-17 ENCOUNTER — TRANSCRIBE ORDERS (OUTPATIENT)
Dept: LAB | Facility: HOSPITAL | Age: 61
End: 2021-11-17

## 2021-11-17 DIAGNOSIS — L03.90 CELLULITIS DUE TO MRSA: ICD-10-CM

## 2021-11-17 DIAGNOSIS — M79.7 SCAPULOHUMERAL FIBROSITIS: ICD-10-CM

## 2021-11-17 DIAGNOSIS — L03.313 CELLULITIS OF CHEST WALL: ICD-10-CM

## 2021-11-17 DIAGNOSIS — B95.62 CELLULITIS DUE TO MRSA: ICD-10-CM

## 2021-11-17 DIAGNOSIS — L02.213 ABSCESS OF CHEST WALL: ICD-10-CM

## 2021-11-17 DIAGNOSIS — M05.79 SEROPOSITIVE RHEUMATOID ARTHRITIS OF MULTIPLE SITES (HCC): ICD-10-CM

## 2021-11-17 DIAGNOSIS — T85.733D INFECTION OF SPINAL CORD STIMULATOR, SUBSEQUENT ENCOUNTER: Primary | ICD-10-CM

## 2021-11-17 DIAGNOSIS — T85.733D INFECTION OF SPINAL CORD STIMULATOR, SUBSEQUENT ENCOUNTER: ICD-10-CM

## 2021-11-17 LAB
ALBUMIN SERPL-MCNC: 4.1 G/DL (ref 3.5–5.2)
ALBUMIN/GLOB SERPL: 1.2 G/DL
ALP SERPL-CCNC: 80 U/L (ref 39–117)
ALT SERPL W P-5'-P-CCNC: 22 U/L (ref 1–33)
ANION GAP SERPL CALCULATED.3IONS-SCNC: 7 MMOL/L (ref 5–15)
AST SERPL-CCNC: 35 U/L (ref 1–32)
BASOPHILS # BLD AUTO: 0.02 10*3/MM3 (ref 0–0.2)
BASOPHILS NFR BLD AUTO: 0.4 % (ref 0–1.5)
BILIRUB SERPL-MCNC: 0.3 MG/DL (ref 0–1.2)
BUN SERPL-MCNC: 9 MG/DL (ref 8–23)
BUN/CREAT SERPL: 12.9 (ref 7–25)
CALCIUM SPEC-SCNC: 9.3 MG/DL (ref 8.6–10.5)
CHLORIDE SERPL-SCNC: 97 MMOL/L (ref 98–107)
CO2 SERPL-SCNC: 32 MMOL/L (ref 22–29)
CREAT SERPL-MCNC: 0.7 MG/DL (ref 0.57–1)
CRP SERPL-MCNC: <0.3 MG/DL (ref 0–0.5)
DEPRECATED RDW RBC AUTO: 42.8 FL (ref 37–54)
EOSINOPHIL # BLD AUTO: 0.42 10*3/MM3 (ref 0–0.4)
EOSINOPHIL NFR BLD AUTO: 7.9 % (ref 0.3–6.2)
ERYTHROCYTE [DISTWIDTH] IN BLOOD BY AUTOMATED COUNT: 12.9 % (ref 12.3–15.4)
GFR SERPL CREATININE-BSD FRML MDRD: 85 ML/MIN/1.73
GLOBULIN UR ELPH-MCNC: 3.3 GM/DL
GLUCOSE SERPL-MCNC: 92 MG/DL (ref 65–99)
HCT VFR BLD AUTO: 33.5 % (ref 34–46.6)
HGB BLD-MCNC: 10.5 G/DL (ref 12–15.9)
IMM GRANULOCYTES # BLD AUTO: 0.02 10*3/MM3 (ref 0–0.05)
IMM GRANULOCYTES NFR BLD AUTO: 0.4 % (ref 0–0.5)
LYMPHOCYTES # BLD AUTO: 1.81 10*3/MM3 (ref 0.7–3.1)
LYMPHOCYTES NFR BLD AUTO: 34.1 % (ref 19.6–45.3)
MCH RBC QN AUTO: 28.8 PG (ref 26.6–33)
MCHC RBC AUTO-ENTMCNC: 31.3 G/DL (ref 31.5–35.7)
MCV RBC AUTO: 91.8 FL (ref 79–97)
MONOCYTES # BLD AUTO: 0.53 10*3/MM3 (ref 0.1–0.9)
MONOCYTES NFR BLD AUTO: 10 % (ref 5–12)
NEUTROPHILS NFR BLD AUTO: 2.51 10*3/MM3 (ref 1.7–7)
NEUTROPHILS NFR BLD AUTO: 47.2 % (ref 42.7–76)
NRBC BLD AUTO-RTO: 0 /100 WBC (ref 0–0.2)
PLAT MORPH BLD: NORMAL
PLATELET # BLD AUTO: 132 10*3/MM3 (ref 140–450)
PMV BLD AUTO: 10 FL (ref 6–12)
POTASSIUM SERPL-SCNC: 3.7 MMOL/L (ref 3.5–5.2)
PROT SERPL-MCNC: 7.4 G/DL (ref 6–8.5)
RBC # BLD AUTO: 3.65 10*6/MM3 (ref 3.77–5.28)
RBC MORPH BLD: NORMAL
SODIUM SERPL-SCNC: 136 MMOL/L (ref 136–145)
WBC MORPH BLD: NORMAL
WBC NRBC COR # BLD: 5.31 10*3/MM3 (ref 3.4–10.8)

## 2021-11-17 PROCEDURE — 86140 C-REACTIVE PROTEIN: CPT

## 2021-11-17 PROCEDURE — 85025 COMPLETE CBC W/AUTO DIFF WBC: CPT

## 2021-11-17 PROCEDURE — 85007 BL SMEAR W/DIFF WBC COUNT: CPT

## 2021-11-17 PROCEDURE — 80053 COMPREHEN METABOLIC PANEL: CPT

## 2021-11-17 PROCEDURE — 36415 COLL VENOUS BLD VENIPUNCTURE: CPT

## 2021-12-15 ENCOUNTER — TRANSCRIBE ORDERS (OUTPATIENT)
Dept: LAB | Facility: HOSPITAL | Age: 61
End: 2021-12-15

## 2021-12-15 ENCOUNTER — LAB (OUTPATIENT)
Dept: LAB | Facility: HOSPITAL | Age: 61
End: 2021-12-15

## 2021-12-15 DIAGNOSIS — B95.62 CELLULITIS DUE TO MRSA: ICD-10-CM

## 2021-12-15 DIAGNOSIS — L03.313 CELLULITIS OF CHEST WALL: ICD-10-CM

## 2021-12-15 DIAGNOSIS — L03.90 CELLULITIS DUE TO MRSA: ICD-10-CM

## 2021-12-15 DIAGNOSIS — L02.213 ABSCESS OF CHEST WALL: ICD-10-CM

## 2021-12-15 DIAGNOSIS — T85.733D INFECTION OF SPINAL CORD STIMULATOR, SUBSEQUENT ENCOUNTER: ICD-10-CM

## 2021-12-15 DIAGNOSIS — T85.733D INFECTION OF SPINAL CORD STIMULATOR, SUBSEQUENT ENCOUNTER: Primary | ICD-10-CM

## 2021-12-15 LAB
BASOPHILS # BLD AUTO: 0.03 10*3/MM3 (ref 0–0.2)
BASOPHILS NFR BLD AUTO: 0.7 % (ref 0–1.5)
CRP SERPL-MCNC: <0.3 MG/DL (ref 0–0.5)
DEPRECATED RDW RBC AUTO: 47.5 FL (ref 37–54)
EOSINOPHIL # BLD AUTO: 0.34 10*3/MM3 (ref 0–0.4)
EOSINOPHIL NFR BLD AUTO: 8.3 % (ref 0.3–6.2)
ERYTHROCYTE [DISTWIDTH] IN BLOOD BY AUTOMATED COUNT: 14.1 % (ref 12.3–15.4)
HCT VFR BLD AUTO: 35.8 % (ref 34–46.6)
HGB BLD-MCNC: 11.2 G/DL (ref 12–15.9)
IMM GRANULOCYTES # BLD AUTO: 0.01 10*3/MM3 (ref 0–0.05)
IMM GRANULOCYTES NFR BLD AUTO: 0.2 % (ref 0–0.5)
LYMPHOCYTES # BLD AUTO: 1.84 10*3/MM3 (ref 0.7–3.1)
LYMPHOCYTES NFR BLD AUTO: 44.9 % (ref 19.6–45.3)
MCH RBC QN AUTO: 28.7 PG (ref 26.6–33)
MCHC RBC AUTO-ENTMCNC: 31.3 G/DL (ref 31.5–35.7)
MCV RBC AUTO: 91.8 FL (ref 79–97)
MONOCYTES # BLD AUTO: 0.36 10*3/MM3 (ref 0.1–0.9)
MONOCYTES NFR BLD AUTO: 8.8 % (ref 5–12)
NEUTROPHILS NFR BLD AUTO: 1.52 10*3/MM3 (ref 1.7–7)
NEUTROPHILS NFR BLD AUTO: 37.1 % (ref 42.7–76)
NRBC BLD AUTO-RTO: 0 /100 WBC (ref 0–0.2)
PLATELET # BLD AUTO: 211 10*3/MM3 (ref 140–450)
PMV BLD AUTO: 9.9 FL (ref 6–12)
RBC # BLD AUTO: 3.9 10*6/MM3 (ref 3.77–5.28)
WBC NRBC COR # BLD: 4.1 10*3/MM3 (ref 3.4–10.8)

## 2021-12-15 PROCEDURE — 86140 C-REACTIVE PROTEIN: CPT

## 2021-12-15 PROCEDURE — 36415 COLL VENOUS BLD VENIPUNCTURE: CPT

## 2021-12-15 PROCEDURE — 85025 COMPLETE CBC W/AUTO DIFF WBC: CPT

## 2022-01-11 ENCOUNTER — APPOINTMENT (OUTPATIENT)
Dept: PREADMISSION TESTING | Facility: HOSPITAL | Age: 62
End: 2022-01-11

## 2022-02-15 ENCOUNTER — APPOINTMENT (OUTPATIENT)
Dept: PREADMISSION TESTING | Facility: HOSPITAL | Age: 62
End: 2022-02-15

## 2022-10-04 ENCOUNTER — TELEPHONE (OUTPATIENT)
Dept: CARDIOLOGY | Facility: CLINIC | Age: 62
End: 2022-10-04

## 2022-10-04 ENCOUNTER — TRANSCRIBE ORDERS (OUTPATIENT)
Dept: CARDIOLOGY | Facility: CLINIC | Age: 62
End: 2022-10-04

## 2022-10-04 DIAGNOSIS — R07.2 PRECORDIAL PAIN: Primary | ICD-10-CM

## 2023-03-12 ENCOUNTER — APPOINTMENT (OUTPATIENT)
Dept: GENERAL RADIOLOGY | Facility: HOSPITAL | Age: 63
End: 2023-03-12
Payer: MEDICARE

## 2023-03-12 ENCOUNTER — HOSPITAL ENCOUNTER (EMERGENCY)
Facility: HOSPITAL | Age: 63
Discharge: HOME | End: 2023-03-12
Attending: EMERGENCY MEDICINE | Admitting: EMERGENCY MEDICINE
Payer: MEDICARE

## 2023-03-12 VITALS
DIASTOLIC BLOOD PRESSURE: 80 MMHG | RESPIRATION RATE: 16 BRPM | BODY MASS INDEX: 22.43 KG/M2 | HEIGHT: 68 IN | HEART RATE: 76 BPM | TEMPERATURE: 98 F | WEIGHT: 148 LBS | OXYGEN SATURATION: 98 % | SYSTOLIC BLOOD PRESSURE: 104 MMHG
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
## 2023-03-12 DIAGNOSIS — S40.011A CONTUSION OF MULTIPLE SITES OF RIGHT SHOULDER, INITIAL ENCOUNTER: ICD-10-CM

## 2023-03-12 DIAGNOSIS — S50.11XA CONTUSION OF RIGHT FOREARM, INITIAL ENCOUNTER: ICD-10-CM

## 2023-03-12 PROCEDURE — 73060 X-RAY EXAM OF HUMERUS: CPT

## 2023-03-12 PROCEDURE — 99283 EMERGENCY DEPT VISIT LOW MDM: CPT

## 2023-03-12 PROCEDURE — 73030 X-RAY EXAM OF SHOULDER: CPT

## 2023-03-12 PROCEDURE — 73090 X-RAY EXAM OF FOREARM: CPT

## 2023-03-12 RX ORDER — HYDROCODONE BITARTRATE AND ACETAMINOPHEN 5; 325 MG/1; MG/1
1 TABLET ORAL EVERY 6 HOURS PRN
Qty: 12 TABLET | Refills: 0 | Status: SHIPPED | OUTPATIENT
Start: 2023-03-12

## 2023-03-12 RX ORDER — NALOXONE HYDROCHLORIDE 4 MG/.1ML
1 SPRAY NASAL AS NEEDED
Qty: 1 EACH | Refills: 0 | Status: SHIPPED | OUTPATIENT
Start: 2023-03-12

## 2023-03-12 RX ORDER — HYDROCODONE BITARTRATE AND ACETAMINOPHEN 5; 325 MG/1; MG/1
1 TABLET ORAL ONCE
Status: COMPLETED | OUTPATIENT
Start: 2023-03-12 | End: 2023-03-12

## 2023-03-12 RX ADMIN — HYDROCODONE BITARTRATE AND ACETAMINOPHEN 1 TABLET: 5; 325 TABLET ORAL at 11:26

## 2023-03-12 NOTE — ED PROVIDER NOTES
Subjective   History of Present Illness  63-year-old female presents emergency department with an injury to the right shoulder.  States she had a trip and fall and hit the door facing with her right shoulder.  Denies she hit the ground.  She reports she had a shoulder replacement about a year ago.  After that had to have a tendon repair due to her fall out of bed.  She denies any numbness or ting no weakness has pain in the right shoulder right elbow and right forearm.  No head injury    History provided by:  Patient   used: No    Shoulder Injury  Location:  Right shoulder right humerus right forearm  Quality:  Sharp  Severity:  Severe  Onset quality:  Sudden  Timing:  Constant  Progression:  Unchanged  Chronicity:  New  Context:  Had a shoulder replacement and repair in the past  Relieved by:  Immobilization  Worsened by:  Movement  Associated symptoms: no abdominal pain, no chest pain, no cough, no fever, no loss of consciousness, no myalgias, no nausea, no rash, no sore throat, no vomiting and no wheezing        Review of Systems   Constitutional: Negative for chills and fever.   HENT: Negative for sore throat.    Respiratory: Negative for cough, chest tightness and wheezing.    Cardiovascular: Negative for chest pain and palpitations.   Gastrointestinal: Negative for abdominal pain, nausea and vomiting.   Genitourinary: Negative for dysuria, frequency and urgency.   Musculoskeletal: Negative for back pain and myalgias.   Skin: Negative for pallor and rash.   Neurological: Negative for loss of consciousness.   Psychiatric/Behavioral: Negative.    All other systems reviewed and are negative.      Past Medical History:   Diagnosis Date   • Abnormal EKG 8/20/2019   • Chest pain in adult 8/20/2019   • Family history of coronary artery disease 8/20/2019       Allergies   Allergen Reactions   • Clindamycin/Lincomycin Other (See Comments)     Degenerative disc disease      • Darvon [Propoxyphene]  Other (See Comments)     Caused Rheumatoid arthritis     • Doxycycline Other (See Comments)     Caused diverticulitis     • Levofloxacin Other (See Comments)     Caused Ulcers    • Rifampin Other (See Comments)     Cause Acid reflux     • Septra [Sulfamethoxazole-Trimethoprim] Other (See Comments)     Caused Fibromyalgia      • Tramadol Other (See Comments)     Caused Chronic nerve damage     • Ultram [Tramadol Hcl] Other (See Comments)     Sleep apnea         Past Surgical History:   Procedure Laterality Date   • BACK SURGERY      X 4    • CHOLECYSTECTOMY     • HYSTERECTOMY     • SPINAL CORD STIMULATOR IMPLANT      X 2   • TUBAL ABDOMINAL LIGATION         History reviewed. No pertinent family history.    Social History     Socioeconomic History   • Marital status:    Tobacco Use   • Smoking status: Never   • Smokeless tobacco: Never   Substance and Sexual Activity   • Alcohol use: No   • Drug use: No   • Sexual activity: Defer           Objective   Physical Exam  Vitals and nursing note reviewed.   Constitutional:       General: She is not in acute distress.     Appearance: She is well-developed. She is not diaphoretic.   HENT:      Head: Normocephalic and atraumatic.      Nose: Nose normal.   Eyes:      General: No scleral icterus.     Conjunctiva/sclera: Conjunctivae normal.   Pulmonary:      Effort: Pulmonary effort is normal. No respiratory distress.   Musculoskeletal:      Cervical back: Normal range of motion and neck supple.      Comments: No point tenderness diffuse tenderness over the anterior posterior shoulder right humerus and right forearm   Skin:     General: Skin is warm and dry.   Neurological:      Mental Status: She is alert and oriented to person, place, and time.   Psychiatric:         Behavior: Behavior normal.         Procedures           ED Course                 No results found for this or any previous visit (from the past 24 hour(s)).  Note: In addition to lab results from this  "visit, the labs listed above may include labs taken at another facility or during a different encounter within the last 24 hours. Please correlate lab times with ED admission and discharge times for further clarification of the services performed during this visit.    XR Forearm 2 View Right   Final Result   Impression:   No acute fracture or traumatic malalignment identified.      Electronically Signed: Liam Pratt     3/12/2023 11:41 AM EDT     Workstation ID: [id number]      XR Humerus Right   Final Result   Impression:   No acute fracture or traumatic malalignment identified.      Electronically Signed: Liam Pratt     3/12/2023 11:41 AM EDT     Workstation ID: [id number]      XR Shoulder 2+ View Right   Final Result   Impression:   No acute fracture or traumatic malalignment identified.      Electronically Signed: Liam Pratt     3/12/2023 11:41 AM EDT     Workstation ID: [id number]        Vitals:    03/12/23 1051   BP: 104/80   Patient Position: Sitting   Pulse: 76   Resp: 16   Temp: 98 °F (36.7 °C)   TempSrc: Oral   SpO2: 98%   Weight: 67.1 kg (148 lb)   Height: 172.7 cm (68\")     Medications   HYDROcodone-acetaminophen (NORCO) 5-325 MG per tablet 1 tablet (1 tablet Oral Given 3/12/23 1126)     ECG/EMG Results (last 24 hours)     ** No results found for the last 24 hours. **        No orders to display                                    Medical Decision Making  Patient a fall she denies she struck the ground but hit the door facing.  She is having pain and concerned about fracture or displacement of her shoulder replacement.      Contusion of multiple sites of right shoulder, initial encounter: acute illness or injury  Contusion of right forearm, initial encounter: acute illness or injury  Fall, initial encounter: acute illness or injury  Amount and/or Complexity of Data Reviewed  Radiology: ordered and independent interpretation performed. Decision-making details documented in ED " Course.      Risk  Prescription drug management.          Final diagnoses:   Fall, initial encounter   Contusion of multiple sites of right shoulder, initial encounter   Contusion of right forearm, initial encounter       ED Disposition  ED Disposition     ED Disposition   Discharge    Condition   Stable    Comment   --             Fletcher Felder MD  [address] [facility] [address] [phone]    Schedule an appointment as soon as possible for a visit       [facility] Emergency Department  [address] [address] [phone]    If symptoms worsen         Medication List      New Prescriptions    HYDROcodone-acetaminophen 5-325 MG per tablet  Commonly known as: NORCO  Take 1 tablet by mouth Every 6 (Six) Hours As Needed for Severe Pain.     naloxone 4 MG/0.1ML nasal spray  Commonly known as: NARCAN  1 spray into the nostril(s) as directed by provider As Needed (Opiate overdose).           Where to Get Your Medications      These medications were sent to [facility] [id number] [facility] [address] - [phone]  - [phone] [facility] [facility] [address]    Phone: [phone]   · HYDROcodone-acetaminophen 5-325 MG per tablet  · naloxone 4 MG/0.1ML nasal spray          Jorge Moe PA  03/13/23 8273

## 2023-06-22 PROBLEM — T84.019A FAILED TOTAL JOINT REPLACEMENT: Status: ACTIVE | Noted: 2023-06-22

## 2023-06-22 PROBLEM — Z96.619 S/P REVERSE TOTAL SHOULDER ARTHROPLASTY: Status: ACTIVE | Noted: 2023-06-22

## 2023-06-22 PROBLEM — G62.9 NEUROPATHY: Status: ACTIVE | Noted: 2023-06-22

## 2023-06-22 PROBLEM — M06.9 RHEUMATOID ARTHRITIS: Status: ACTIVE | Noted: 2023-06-22

## 2023-06-22 PROBLEM — K21.9 GERD (GASTROESOPHAGEAL REFLUX DISEASE): Status: ACTIVE | Noted: 2023-06-22

## 2023-07-25 ENCOUNTER — READMISSION MANAGEMENT (OUTPATIENT)
Dept: CALL CENTER | Facility: HOSPITAL | Age: 63
End: 2023-07-25
Payer: MEDICARE

## 2023-07-25 NOTE — OUTREACH NOTE
Total Joint Month 1 Survey      Flowsheet Row Responses   [facility] patient discharged from? [place]   Does the patient have one of the following disease processes/diagnoses(primary or secondary)? Total Joint Replacement   Joint surgery performed? Shoulder   Month 1 attempt successful? Yes   Call start time 1218   Call end time 1219   Has the patient been back in either the hospital or Emergency Department since discharge? No   Is the patient taking all medications as directed (includes completed medication regime)? Yes   Has the patient kept scheduled appointments due by today? Yes   Is the patient still receiving Home Health Services? N/A   Is the patient still attending therapy sessions(either in the home or as an outpatient)? No   Has the patient fallen since discharge? No   What is the patient's perception of their functional status since discharge? Improving   Is the patient able to teach back signs and symptoms of infection? Temp >100.4 for 24h or longer, Incisional drainage, Blisters around incision, Increased swelling or redness around incision (not associated with surgical edema)   Month 1 call completed? Yes   Graduated Yes   Graduated/Revoked comments Pt improving   Call end time 1219            Richelle H - Registered Nurse

## 2024-05-30 ENCOUNTER — OFFICE VISIT (OUTPATIENT)
Age: 64
End: 2024-05-30
Payer: MEDICARE

## 2024-05-30 ENCOUNTER — TELEPHONE (OUTPATIENT)
Age: 64
End: 2024-05-30

## 2024-05-30 VITALS
WEIGHT: 159.2 LBS | SYSTOLIC BLOOD PRESSURE: 98 MMHG | HEIGHT: 68 IN | TEMPERATURE: 97.9 F | HEART RATE: 71 BPM | BODY MASS INDEX: 24.13 KG/M2 | DIASTOLIC BLOOD PRESSURE: 72 MMHG

## 2024-05-30 DIAGNOSIS — R76.12 POSITIVE QUANTIFERON-TB GOLD TEST: ICD-10-CM

## 2024-05-30 DIAGNOSIS — M79.7 FIBROMYALGIA: ICD-10-CM

## 2024-05-30 DIAGNOSIS — R53.83 OTHER FATIGUE: Primary | ICD-10-CM

## 2024-05-30 DIAGNOSIS — M81.8 OTHER OSTEOPOROSIS WITHOUT CURRENT PATHOLOGICAL FRACTURE: ICD-10-CM

## 2024-05-30 DIAGNOSIS — Z79.899 HIGH RISK MEDICATION USE: ICD-10-CM

## 2024-05-30 DIAGNOSIS — M06.9 RHEUMATOID ARTHRITIS INVOLVING MULTIPLE SITES, UNSPECIFIED WHETHER RHEUMATOID FACTOR PRESENT: ICD-10-CM

## 2024-05-30 DIAGNOSIS — L98.9 SKIN LESION: ICD-10-CM

## 2024-05-30 RX ORDER — HYDROXYCHLOROQUINE SULFATE 200 MG/1
TABLET, FILM COATED ORAL
Qty: 60 TABLET | Refills: 3 | Status: SHIPPED | OUTPATIENT
Start: 2024-05-30

## 2024-05-30 NOTE — ASSESSMENT & PLAN NOTE
Infliximab/plaquenil  -- well tolerated and effective.   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 PATIENT NEEDS ANNUAL CXR.  CT of the lung March 2017- small area of Nodular ground glass change in the right lower lobe- F/u was recommended- Mild change of COPD.  Patient saw pulmonary  who personally reviewed the scan and told her that it was nothing to worry about.  CXR normal 5/23- Repeat Today.   S/p Covid vaccine. She does not plan on getting a booster.    Plan:    Cbc,Cmp 1/24 &  4/2024  Eye check with normal OCT- Due 10/2023.      Would hold Infliximab  for any infection or illness, Seek treatment and when well and illness is resolved you may restart medication.

## 2024-05-30 NOTE — ASSESSMENT & PLAN NOTE
Negative 6/2018 but she had positive TSPOT in 11/2020.  She had TSPOT 12/2020 that was positive .  CXR negative 5/23  She was started on INH x 6 months.    Plan:    No evidence of active TB.  S/p INH  CXRAY due this month.

## 2024-05-30 NOTE — PROGRESS NOTES
[facility] Office Follow Up Visit     Office Follow Up      Date: 05/30/2024   Patient Name: Lisa Mendez  MRN: [id number]  YOB: 1960    Referring Physician: Pan Carrion,*     Chief Complaint   Patient presents with    Rheumatoid Arthritis       History of Present Illness: Lisa Mendez is a 64 y.o. female who is here today for follow up on RA.  She has 30 minutes of morning stiffness. Last Remicade 5/23/24 at [facility]. Pain score is 2/10, gets worse with age.  In the winter she gets cold and achy.  She received 4 gel injections in left knee since last visit.  She is currently using a walker.     Result Review :      Data reviewed : 1/24 CBC, CMP Normal  4/24 Vit D 44.1, Glucose 168.       Subjective     Allergies   Allergen Reactions    Clindamycin/Lincomycin Other (See Comments)     Degenerative disc disease       Darvon [Propoxyphene] Other (See Comments)     Caused Rheumatoid arthritis      Doxycycline Other (See Comments)     Caused diverticulitis      Levofloxacin Other (See Comments)     Caused Ulcers     Rifampin Other (See Comments)     Cause Acid reflux      Septra [Sulfamethoxazole-Trimethoprim] Other (See Comments)     Caused Fibromyalgia       Tramadol Other (See Comments)     Caused Chronic nerve damage           Current Outpatient Medications:     ALPRAZolam (XANAX) 0.5 MG tablet, Take 1 tablet by mouth 2 (Two) Times a Day., Disp: , Rfl:     cholecalciferol (VITAMIN D3) 1000 units tablet, Take 5 tablets by mouth Daily., Disp: , Rfl:     denosumab (Prolia) 60 MG/ML solution prefilled syringe syringe, Inject  under the skin into the appropriate area as directed. EVERY (6) MONTHS, Disp: , Rfl:     FERROUS GLUCONATE PO, Take 324 mg by mouth Daily., Disp: , Rfl:     FLUoxetine (PROzac) 40 MG capsule, Take 1 capsule by mouth Every Night., Disp: , Rfl:     gabapentin (NEURONTIN) 300 MG capsule, Take 1 capsule by mouth 3 (Three)  Times a Day., Disp: , Rfl:     glucosamine sulfate 500 MG capsule capsule, Take 1 capsule by mouth 3 (Three) Times a Day With Meals., Disp: , Rfl:     hydroxychloroquine (PLAQUENIL) 200 MG tablet, Take 1 tablet by mouth 2 (Two) Times a Day., Disp: , Rfl:     inFLIXimab (Remicade) 100 MG injection, Administer remicade 600mg iv every 6 weeks, Disp: , Rfl:     LORazepam (ATIVAN) 1 MG tablet, Take 0.5 tablets by mouth Daily., Disp: , Rfl:     Omega-3 Fatty Acids (OMEGA 3 PO), Take 1 capsule by mouth 2 (Two) Times a Day., Disp: , Rfl:     primidone (MYSOLINE) 50 MG tablet, Take 5 tablets by mouth 3 (Three) Times a Day., Disp: , Rfl:     vitamin E 400 UNIT capsule, Take 2 capsules by mouth Daily., Disp: , Rfl:     ALPRAZolam (XANAX) 0.25 MG tablet, Take 1 tablet by mouth Daily. (Patient not taking: Reported on 5/30/2024), Disp: , Rfl:     CALCIUM-VITAMIN D PO, Take 630 mg by mouth 2 (Two) Times a Day. (Patient not taking: Reported on 5/30/2024), Disp: , Rfl:     Cyanocobalamin (VITAMIN B-12) 3000 MCG sublingual tablet, Place  under the tongue. TWO TIMES A WEEK, Disp: , Rfl:     diphenhydrAMINE (BENADRYL) 50 MG/ML injection, Infuse 1 mL into a venous catheter As Needed. 50mg (Patient not taking: Reported on 5/30/2024), Disp: , Rfl:     docusate sodium (Colace) 100 MG capsule, Take 1 capsule by mouth 2 (Two) Times a Day., Disp: 60 capsule, Rfl: 0    famotidine (PEPCID) 40 MG tablet, Take 1 tablet by mouth As Needed (Remicade). (Patient not taking: Reported on 5/30/2024), Disp: , Rfl:     Multiple Vitamins-Minerals (MULTIVITAMIN ADULT PO), Take 1 tablet by mouth 2 (Two) Times a Day. (Patient not taking: Reported on 5/30/2024), Disp: , Rfl:     omeprazole (priLOSEC) 20 MG capsule, Take 1 capsule by mouth Daily. (Patient not taking: Reported on 5/30/2024), Disp: , Rfl:     oxyCODONE-acetaminophen (Percocet) 5-325 MG per tablet, Take 1 tablet by mouth Every 6 (Six) Hours As Needed for Severe Pain., Disp: 30 tablet, Rfl: 0     Probiotic Product (PROBIOTIC PO), , Disp: , Rfl:     ropivacaine (NAROPIN) 0.2 % infusion (INFUSYSTEM), 2 mg/hr by Peripheral Nerve route Continuous., Disp: , Rfl:     Past Medical History:   Diagnosis Date    Abnormal EKG 08/20/2019    Arthritis     Chest pain in adult 08/20/2019    Diverticulitis     DVT (deep venous thrombosis) 2007    LEFT LEG    Family history of coronary artery disease 08/20/2019    Fibromyalgia     GERD (gastroesophageal reflux disease)     History of transfusion     [facility], 2 UNITS, NO REACTION    Hx of degenerative disc disease     Low platelet count     Migraine     MRSA (methicillin resistant Staphylococcus aureus) 2020    pain pump incision    Nerve damage     Neuropathy     Rheumatoid arthritis         Past Surgical History:   Procedure Laterality Date    BACK SURGERY      X 4     CHOLECYSTECTOMY      COLONOSCOPY      ENDOSCOPY      GASTRIC BYPASS      HYSTERECTOMY      NECK SURGERY      SPINAL CORD STIMULATOR IMPLANT      X 2    SPINAL CORD STIMULATOR REMOVAL      TOTAL SHOULDER ARTHROPLASTY Right 02/2022    TOTAL SHOULDER REVISION Right 6/22/2023    Procedure: REVISION TOTAL SHOULDER ARTHROPLASTY TO REVERSE TOTAL SHOULDER ARTHROPLASTY - RIGHT;  Surgeon: Fletcher Felder MD;  Location: [facility];  Service: Orthopedics;  Laterality: Right;    TUBAL ABDOMINAL LIGATION         History reviewed. No pertinent family history.     Social History     Socioeconomic History    Marital status:    Tobacco Use    Smoking status: Never     Passive exposure: Never    Smokeless tobacco: Never   Vaping Use    Vaping status: Never Used   Substance and Sexual Activity    Alcohol use: No    Drug use: No    Sexual activity: Defer       Review of Systems   Constitutional:  Negative for fatigue and fever.   HENT:  Negative for mouth sores, nosebleeds, swollen glands and trouble swallowing.         Hoarseness  Hearing loss  Nasal drainage   Eyes:  Negative for blurred vision, double vision,  "photophobia, pain and visual disturbance.   Respiratory:  Negative for apnea, cough, choking and shortness of breath.    Cardiovascular:  Negative for chest pain and palpitations.        Claudication    Gastrointestinal:  Negative for abdominal pain, blood in stool, constipation, diarrhea, nausea, vomiting and GERD.   Endocrine: Positive for cold intolerance. Negative for heat intolerance, polydipsia, polyphagia and polyuria.        Hair loss     Genitourinary:  Negative for difficulty urinating, dysuria, genital sores, hematuria and urinary incontinence.        Nocturia   Musculoskeletal:  Positive for myalgias. Negative for arthralgias, back pain, gait problem, joint swelling, neck pain, neck stiffness and bursitis.        Joint pain  Morning stiffness     Skin:  Negative for rash.        Itching   Allergic/Immunologic: Positive for food allergies. Negative for environmental allergies.        Itching    Neurological:  Positive for tremors. Negative for dizziness, seizures, syncope, weakness, numbness, headache, memory problem and confusion.        Extremity weakness  Extremity numbness  Gait disturbance    Hematological:  Negative for adenopathy. Bruises/bleeds easily.   Psychiatric/Behavioral:  Negative for sleep disturbance, suicidal ideas, depressed mood and stress. The patient is nervous/anxious.         I have reviewed and updated the patient's chief complaint, history of present illness, review of systems, past medical history, surgical history, family history, social history, medications and allergy list as appropriate.     Objective    Vital Signs:   Vitals:    05/30/24 1429   BP: 98/72   BP Location: Left arm   Patient Position: Sitting   Cuff Size: Adult   Pulse: 71   Temp: 97.9 °F (36.6 °C)   TempSrc: Temporal   Weight: 72.2 kg (159 lb 3.2 oz)   Height: 172.7 cm (68\")   PainSc:   2   PainLoc: Generalized     Lisayamile Mendez reports a pain score of 2.  Given her pain assessment as noted, treatment " options were discussed and the following options were decided upon as a follow-up plan to address the patient's pain: continuation of current treatment plan for pain.      Body mass index is 24.21 kg/m².  BMI is within normal parameters. No other follow-up for BMI required.      Physical Exam  Constitutional:       General: She is not in acute distress.     Appearance: She is not ill-appearing, toxic-appearing or diaphoretic.   HENT:      Right Ear: External ear normal.      Left Ear: External ear normal.      Nose: Nose normal. No congestion.      Mouth/Throat:      Pharynx: Oropharynx is clear. No oropharyngeal exudate or posterior oropharyngeal erythema.   Eyes:      Extraocular Movements: Extraocular movements intact.      Conjunctiva/sclera: Conjunctivae normal.      Pupils: Pupils are equal, round, and reactive to light.   Cardiovascular:      Rate and Rhythm: Normal rate and regular rhythm.      Pulses: Normal pulses.      Heart sounds: Normal heart sounds.   Pulmonary:      Effort: Pulmonary effort is normal.      Breath sounds: Normal breath sounds.   Abdominal:      General: Abdomen is flat. Bowel sounds are normal.      Palpations: Abdomen is soft.   Musculoskeletal:         General: Normal range of motion.      Right knee: Crepitus present.      Left knee: Crepitus present.      Comments: FMS points 2/18  Thoracic Hyposis   Traps are tender.      Skin:     General: Skin is warm and dry.      Capillary Refill: Capillary refill takes less than 2 seconds.      Comments: Bruising under right eye, bluish green.     Neurological:      General: No focal deficit present.      Mental Status: She is oriented to person, place, and time.      Cranial Nerves: No cranial nerve deficit.      Motor: No weakness.      Deep Tendon Reflexes: Reflexes normal.   Psychiatric:         Mood and Affect: Mood normal.         Behavior: Behavior normal.         Thought Content: Thought content normal.          Physical Exam  There  is currently no information documented on the homunculus. Go to the Rheumatology activity and complete the homunculus joint exam.     Results Review:   Imaging Results (Last 24 Hours)       ** No results found for the last 24 hours. **            Procedures    Assessment / Plan    Assessment/Plan:   Diagnoses and all orders for this visit:    1. Other fatigue (Primary)    2. Rheumatoid arthritis involving multiple sites, unspecified whether rheumatoid factor present  Assessment & Plan:  On Infliximab/plaquenil- Well tolerated and effective on current dose 600mg.  Using a walker.  Some increased aches in the cold weather- feet, ankles, knees, shoulders.    Plan:  Continue Infliximab.  She feels that the cold weather has caused her to flare.   Continue Plaquenil BID. Ideally she has been on this for quite a while. With her weight, I would like her to go down to once daily, but she does not tolerate this. We will continue this dose as long as she continues to have normal OCT exams.  RTC in 4 months.    Denies CHF, Chronic infection, MS. + TB and treated. H/o diverticulitis, Hepatitis, UC, COPD.        3. Fibromyalgia  Assessment & Plan:  Doing well if stress controlled and sleeping well.  Chronic sx wax/wane.  She doesn't have much issue with this.  Does flare with poor sleep or increased stress.  Currently on Fluoxetine and Gabapentin.  She did not like Biofreeze.   Doing fairly well.      Plan:  Fibro flares with bad weather and stress. Can use Biofreeze for muscle pain.  Observation.  Heat, ice.  Continues gabapentin.  Exercise and rest.        4. Skin lesion  Assessment & Plan:  Patient has had issues with pustular skin lesions on her hips in the past. Cx was MRSA in past.  While on  Infliximab she has not had these lesions for several years. Have flared off Infliximab requiring treatment.  Resolved.    Plan:    Suspect they represent HS and were controlled with Infliximab.  No reoccurrence.      5. Positive  QuantiFERON-TB Gold test  Assessment & Plan:  Negative 6/2018 but she had positive TSPOT in 11/2020.  She had TSPOT 12/2020 that was positive .  CXR negative 5/23  She was started on INH x 6 months.    Plan:    No evidence of active TB.  S/p INH  CXRAY due this month.       6. Other osteoporosis without current pathological fracture  Assessment & Plan:  Dexa scan 1/4/23 L femoral neck -2.2 decreased 14.6%, L total hip -2 decreased 11.8%,  Spine is to arthritic/ surgery, Forearm 1/3rd value is -2.8, Total -2.1 decreased 11%.  Started Fosamax July 2021- Discontinue 1/4/2023   Bone Density is worsening.  PTH 68 (65), N telopeptide and osteocalcin normal, SPEP normal.  Normal PTH intact 43 in 9/23    Plan:  Calcium 800-1000mg per day or 2 ES TUMS daily.  Needs Vitamin D 1000-2000IU daily of OTC D  Weight bearing exercise.  Started Prolia Feb 2023.  Last injection 4/19/24  Repeat Dexa in Jan 2025.  Off Prolia 3 months prior and 3 months after dental procedure such as Tooth extraction, Implants or root canal.        7. High risk medication use  Assessment & Plan:  Infliximab/plaquenil  -- well tolerated and effective.   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 PATIENT NEEDS ANNUAL CXR.  CT of the lung March 2017- small area of Nodular ground glass change in the right lower lobe- F/u was recommended- Mild change of COPD.  Patient saw pulmonary  who personally reviewed the scan and told her that it was nothing to worry about.  CXR normal 5/23- Repeat Today.   S/p Covid vaccine. She does not plan on getting a booster.    Plan:    Cbc,Cmp 1/24 &  4/2024  Eye check with normal OCT- Due 10/2023.      Would hold Infliximab  for any infection or illness, Seek treatment and when well and illness is resolved you may restart medication.                     Follow Up:   Return in about 4 months (around 9/30/2024).        Scribed for Rocoi Olmos MD by Kayla Palafox RN 5/30/2024  16:06 EDT      Rocio GRANGER  MD Nickolas  [facility]

## 2024-05-30 NOTE — ASSESSMENT & PLAN NOTE
Patient has had issues with pustular skin lesions on her hips in the past. Cx was MRSA in past.  While on  Infliximab she has not had these lesions for several years. Have flared off Infliximab requiring treatment.  Resolved.    Plan:    Suspect they represent HS and were controlled with Infliximab.  No reoccurrence.

## 2024-05-30 NOTE — ASSESSMENT & PLAN NOTE
Dexa scan 1/4/23 L femoral neck -2.2 decreased 14.6%, L total hip -2 decreased 11.8%,  Spine is to arthritic/ surgery, Forearm 1/3rd value is -2.8, Total -2.1 decreased 11%.  Started Fosamax July 2021- Discontinue 1/4/2023   Bone Density is worsening.  PTH 68 (65), N telopeptide and osteocalcin normal, SPEP normal.  Normal PTH intact 43 in 9/23    Plan:  Calcium 800-1000mg per day or 2 ES TUMS daily.  Needs Vitamin D 1000-2000IU daily of OTC D  Weight bearing exercise.  Started Prolia Feb 2023.  Last injection 4/19/24  Repeat Dexa in Jan 2025.  Off Prolia 3 months prior and 3 months after dental procedure such as Tooth extraction, Implants or root canal.

## 2024-05-30 NOTE — ASSESSMENT & PLAN NOTE
On Infliximab/plaquenil- Well tolerated and effective on current dose 600mg.  Using a walker.  Some increased aches in the cold weather- feet, ankles, knees, shoulders.    Plan:  Continue Infliximab.  She feels that the cold weather has caused her to flare.   Continue Plaquenil BID. Ideally she has been on this for quite a while. With her weight, I would like her to go down to once daily, but she does not tolerate this. We will continue this dose as long as she continues to have normal OCT exams.  RTC in 4 months.    Denies CHF, Chronic infection, MS. + TB and treated. H/o diverticulitis, Hepatitis, UC, COPD.

## 2024-05-30 NOTE — ASSESSMENT & PLAN NOTE
Doing well if stress controlled and sleeping well.  Chronic sx wax/wane.  She doesn't have much issue with this.  Does flare with poor sleep or increased stress.  Currently on Fluoxetine and Gabapentin.  She did not like Biofreeze.   Doing fairly well.      Plan:  Fibro flares with bad weather and stress. Can use Biofreeze for muscle pain.  Observation.  Heat, ice.  Continues gabapentin.  Exercise and rest.

## 2024-10-07 PROBLEM — R53.83 OTHER FATIGUE: Status: ACTIVE | Noted: 2024-10-07

## 2024-10-08 ENCOUNTER — OFFICE VISIT (OUTPATIENT)
Age: 64
End: 2024-10-08
Payer: MEDICARE

## 2024-10-08 VITALS
HEIGHT: 68 IN | BODY MASS INDEX: 25.76 KG/M2 | TEMPERATURE: 97.1 F | HEART RATE: 57 BPM | SYSTOLIC BLOOD PRESSURE: 102 MMHG | DIASTOLIC BLOOD PRESSURE: 76 MMHG | WEIGHT: 170 LBS

## 2024-10-08 DIAGNOSIS — M81.8 OTHER OSTEOPOROSIS WITHOUT CURRENT PATHOLOGICAL FRACTURE: ICD-10-CM

## 2024-10-08 DIAGNOSIS — R53.83 OTHER FATIGUE: ICD-10-CM

## 2024-10-08 DIAGNOSIS — M79.7 FIBROMYALGIA: ICD-10-CM

## 2024-10-08 DIAGNOSIS — R76.12 POSITIVE QUANTIFERON-TB GOLD TEST: Primary | ICD-10-CM

## 2024-10-08 DIAGNOSIS — L98.9 SKIN LESION: ICD-10-CM

## 2024-10-08 DIAGNOSIS — Z79.899 HIGH RISK MEDICATION USE: ICD-10-CM

## 2024-10-08 DIAGNOSIS — M06.9 RHEUMATOID ARTHRITIS INVOLVING MULTIPLE SITES, UNSPECIFIED WHETHER RHEUMATOID FACTOR PRESENT: ICD-10-CM

## 2024-10-08 RX ORDER — HYDROXYCHLOROQUINE SULFATE 200 MG/1
TABLET, FILM COATED ORAL
Qty: 60 TABLET | Refills: 3 | Status: SHIPPED | OUTPATIENT
Start: 2024-10-08 | End: 2025-01-10

## 2024-10-08 NOTE — PROGRESS NOTES
[facility] Follow Up Visit     Office Follow Up      Date: 10/08/2024   Patient Name: Lisa Mendez  MRN: [id number]  YOB: 1960    Referring Physician: No ref. provider found     Chief Complaint   Patient presents with    Follow-up       History of Present Illness: Lisa Mendez is a 64 y.o. female who is here today for follow up on   History of Present Illness  The patient is a 64-year-old female who presents for evaluation of multiple medical concerns.    She reports experiencing morning stiffness lasting for an hour, with pain intensity currently at 7 out of 10. Prolonged sitting exacerbates her stiffness. She experiences constant aching in her back, which radiates down both legs. An EMG of her back and legs was conducted, and a CT myelogram was performed; she is awaiting the results.    She has been dealing with some family issues which have triggered her fibromyalgia. Her fibromyalgia flares up during periods of stress. She takes gabapentin 800 mg three times a day for this condition and uses heat and ice for relief. She manages the discomfort with Tylenol and continues to take Plaquenil twice daily. She is due for an eye check for her Plaquenil either this month or the next.    Her last infliximab infusion was administered on 09/26/2024 at [facility], which she believes is effective. She has not received any gel injections in her knees. She reports aching in most of her joints, particularly the right wrist, left shoulder, left knee, and left ankle, but no swelling.    She continues to take calcium and vitamin D supplements. Her next Prolia injection is scheduled for 10/18/2024. She no longer takes Pepcid.       Result Review :        Results  Laboratory Studies  Blood sugar was about 183 (nonfasting). CBC and CMP were normal.    Testing  EMG done on back and legs.          Subjective     Allergies   Allergen Reactions     Clindamycin/Lincomycin Other (See Comments)     Degenerative disc disease       Darvon [Propoxyphene] Other (See Comments)     Caused Rheumatoid arthritis      Doxycycline Other (See Comments)     Caused diverticulitis      Levofloxacin Other (See Comments)     Caused Ulcers     Rifampin Other (See Comments)     Cause Acid reflux      Septra [Sulfamethoxazole-Trimethoprim] Other (See Comments)     Caused Fibromyalgia       Tramadol Other (See Comments)     Caused Chronic nerve damage           Current Outpatient Medications:     ALPRAZolam (XANAX) 0.25 MG tablet, Take 1 tablet by mouth Daily., Disp: , Rfl:     CALCIUM-VITAMIN D PO, Take 630 mg by mouth 2 (Two) Times a Day., Disp: , Rfl:     cholecalciferol (VITAMIN D3) 1000 units tablet, Take 5 tablets by mouth Daily., Disp: , Rfl:     Cyanocobalamin (VITAMIN B-12) 3000 MCG sublingual tablet, Place  under the tongue. TWO TIMES A WEEK, Disp: , Rfl:     denosumab (Prolia) 60 MG/ML solution prefilled syringe syringe, Inject  under the skin into the appropriate area as directed. EVERY (6) MONTHS, Disp: , Rfl:     FERROUS GLUCONATE PO, Take 324 mg by mouth Daily., Disp: , Rfl:     FLUoxetine (PROzac) 40 MG capsule, Take 1 capsule by mouth Every Night., Disp: , Rfl:     gabapentin (NEURONTIN) 300 MG capsule, Take 1 capsule by mouth 3 (Three) Times a Day., Disp: , Rfl:     glucosamine sulfate 500 MG capsule capsule, Take 1 capsule by mouth 3 (Three) Times a Day With Meals., Disp: , Rfl:     hydroxychloroquine (PLAQUENIL) 200 MG tablet, Take 1 tablet by oral route 2 times every day, Disp: 60 tablet, Rfl: 3    inFLIXimab (Remicade) 100 MG injection, Administer remicade 600mg iv every 6 weeks, Disp: , Rfl:     LORazepam (ATIVAN) 1 MG tablet, Take 0.5 tablets by mouth Daily., Disp: , Rfl:     Omega-3 Fatty Acids (OMEGA 3 PO), Take 1 capsule by mouth 2 (Two) Times a Day., Disp: , Rfl:     primidone (MYSOLINE) 50 MG tablet, Take 5 tablets by mouth 3 (Three) Times a Day., Disp: ,  Rfl:     Probiotic Product (PROBIOTIC PO), , Disp: , Rfl:     vitamin E 400 UNIT capsule, Take 2 capsules by mouth Daily., Disp: , Rfl:     ALPRAZolam (XANAX) 0.5 MG tablet, Take 1 tablet by mouth 2 (Two) Times a Day., Disp: , Rfl:     diphenhydrAMINE (BENADRYL) 50 MG/ML injection, Infuse 1 mL into a venous catheter As Needed. 50mg (Patient not taking: Reported on 5/30/2024), Disp: , Rfl:     docusate sodium (Colace) 100 MG capsule, Take 1 capsule by mouth 2 (Two) Times a Day., Disp: 60 capsule, Rfl: 0    famotidine (PEPCID) 40 MG tablet, Take 1 tablet by mouth As Needed (Remicade). (Patient not taking: Reported on 5/30/2024), Disp: , Rfl:     Multiple Vitamins-Minerals (MULTIVITAMIN ADULT PO), Take 1 tablet by mouth 2 (Two) Times a Day. (Patient not taking: Reported on 5/30/2024), Disp: , Rfl:     omeprazole (priLOSEC) 20 MG capsule, Take 1 capsule by mouth Daily. (Patient not taking: Reported on 5/30/2024), Disp: , Rfl:     oxyCODONE-acetaminophen (Percocet) 5-325 MG per tablet, Take 1 tablet by mouth Every 6 (Six) Hours As Needed for Severe Pain., Disp: 30 tablet, Rfl: 0    ropivacaine (NAROPIN) 0.2 % infusion (INFUSYSTEM), 2 mg/hr by Peripheral Nerve route Continuous., Disp: , Rfl:     Past Medical History:   Diagnosis Date    Abnormal EKG 08/20/2019    Arthritis     Chest pain in adult 08/20/2019    Diverticulitis     DVT (deep venous thrombosis) 2007    LEFT LEG    Family history of coronary artery disease 08/20/2019    Fibromyalgia     GERD (gastroesophageal reflux disease)     History of transfusion     [facility], 2 UNITS, NO REACTION    Hx of degenerative disc disease     Low platelet count     Migraine     MRSA (methicillin resistant Staphylococcus aureus) 2020    pain pump incision    Nerve damage     Neuropathy     Rheumatoid arthritis         Past Surgical History:   Procedure Laterality Date    BACK SURGERY      X 4     CHOLECYSTECTOMY      COLONOSCOPY      ENDOSCOPY      GASTRIC BYPASS      HYSTERECTOMY       NECK SURGERY      SPINAL CORD STIMULATOR IMPLANT      X 2    SPINAL CORD STIMULATOR REMOVAL      TOTAL SHOULDER ARTHROPLASTY Right 02/2022    TOTAL SHOULDER REVISION Right 6/22/2023    Procedure: REVISION TOTAL SHOULDER ARTHROPLASTY TO REVERSE TOTAL SHOULDER ARTHROPLASTY - RIGHT;  Surgeon: Fletcher Felder MD;  Location: [facility];  Service: Orthopedics;  Laterality: Right;    TUBAL ABDOMINAL LIGATION         No family history on file.     Social History     Socioeconomic History    Marital status:    Tobacco Use    Smoking status: Never     Passive exposure: Never    Smokeless tobacco: Never   Vaping Use    Vaping status: Never Used   Substance and Sexual Activity    Alcohol use: No    Drug use: No    Sexual activity: Defer       Review of Systems   Constitutional:  Positive for fatigue and unexpected weight gain. Negative for fever.   HENT:  Negative for mouth sores, nosebleeds, swollen glands and trouble swallowing.         Dry mouth  Nasal drainage     Eyes:  Negative for blurred vision, double vision, photophobia, pain and visual disturbance.   Respiratory:  Negative for apnea, cough, choking and shortness of breath.    Cardiovascular:  Negative for chest pain, palpitations and leg swelling.        Claudication     Gastrointestinal:  Negative for abdominal pain, blood in stool, constipation, diarrhea, nausea, vomiting and GERD.   Endocrine: Positive for cold intolerance. Negative for heat intolerance, polydipsia, polyphagia and polyuria.   Genitourinary:  Negative for difficulty urinating, dysuria, genital sores, hematuria and urinary incontinence.   Musculoskeletal:  Positive for arthralgias and myalgias. Negative for back pain, gait problem, joint swelling, neck pain, neck stiffness and bursitis.        Joint tenderness  Morning stiffness     Skin:  Negative for rash.   Allergic/Immunologic: Negative for environmental allergies and food allergies.   Neurological:  Positive for weakness and  "numbness. Negative for dizziness, tremors, seizures, syncope, headache, memory problem and confusion.        Tingling   Hematological:  Negative for adenopathy. Bruises/bleeds easily.   Psychiatric/Behavioral:  Negative for sleep disturbance, suicidal ideas, depressed mood and stress. The patient is not nervous/anxious.       I have reviewed and updated the patient's chief complaint, history of present illness, review of systems, past medical history, surgical history, family history, social history, medications and allergy list as appropriate.     Objective    Vital Signs:   Vitals:    10/08/24 1359   Weight: 77.1 kg (170 lb)   Height: 172.7 cm (68\")   PainSc:   7     Lisa Mendez reports a pain score of 7.  Given her pain assessment as noted, treatment options were discussed and the following options were decided upon as a follow-up plan to address the patient's pain: .      Body mass index is 25.85 kg/m².        Physical Exam   Physical Exam  Patient is an alert female, no acute distress.  Head is atraumatic, normocephalic. Pupils are equal and round. Extraocular muscles intact. Oropharynx is negative, but the tongue is slightly dry today.  Lungs are clear.  Heart is regular without rubs, gallops, or murmurs.  Lumbar spine is tender. Thoracic is not but there is thoracic kyphosis. Right elbow is tender. Left shoulder is tender. MCPs are tender on the right and the left, but there is no synovitis today. There is crepitus in the left knee. Knees are tender, but no synovitis today.  Skin is negative for any acute rashes.    Physical Exam  There is currently no information documented on the homunculus. Go to the Rheumatology activity and complete the homunculus joint exam.     Results Review:   Imaging Results (Last 24 Hours)       ** No results found for the last 24 hours. **            Procedures    Assessment / Plan    Assessment/Plan:   Diagnoses and all orders for this visit:    1. 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  She had TSPOT 12/2020 that was positive .  CXR negative 5/23  She was started on INH x 6 months.    Plan:    No evidence of active TB.  S/p INH  CXRAY due this month.       2. Rheumatoid arthritis involving multiple sites, unspecified whether rheumatoid factor present  Assessment & Plan:  On Infliximab/plaquenil- Well tolerated and effective on current dose 600mg.  Using a walker.  Some increased aches in the cold weather- feet, ankles, knees, shoulders.    Plan:  Continue Infliximab.  She feels that the cold weather has caused her to flare.   Continue Plaquenil BID. Ideally she has been on this for quite a while. With her weight, I would like her to go down to once daily, but she does not tolerate this. We will continue this dose as long as she continues to have normal OCT exams.  RTC in 4 months.    Denies CHF, Chronic infection, MS. + TB and treated. H/o diverticulitis, Hepatitis, UC, COPD.      3. Fibromyalgia  Assessment & Plan:  Doing well if stress controlled and sleeping well.  Chronic sx wax/wane.  She doesn't have much issue with this.  Does flare with poor sleep or increased stress.  Currently on Fluoxetine and Gabapentin.  She did not like Biofreeze.   Doing fairly well.      Plan:  Fibro flares with bad weather and stress. Can use Biofreeze for muscle pain.  Observation.  Heat, ice.  Continues gabapentin.  Exercise and rest.      4. Skin lesion  Assessment & Plan:  Patient has had issues with pustular skin lesions on her hips in the past. Cx was MRSA in past.  While on  Infliximab she has not had these lesions for several years. Have flared off Infliximab requiring treatment.  Resolved.    Plan:    Suspect they represent HS and were controlled with Infliximab.  No reoccurrence.      5. Other osteoporosis without current pathological fracture  Assessment & Plan:  Dexa scan 1/4/23 L femoral neck -2.2 decreased  14.6%, L total hip -2 decreased 11.8%,  Spine is to arthritic/ surgery, Forearm 1/3rd value is -2.8, Total -2.1 decreased 11%.  Started Fosamax July 2021- Discontinue 1/4/2023   Bone Density is worsening.  PTH 68 (65), N telopeptide and osteocalcin normal, SPEP normal.  Normal PTH intact 43 in 9/23    Plan:  Calcium 800-1000mg per day or 2 ES TUMS daily.  Needs Vitamin D 1000-2000IU daily of OTC D  Weight bearing exercise.  Started Prolia Feb 2023.  Last injection 4/19/24  Repeat Dexa in Jan 2025.  Off Prolia 3 months prior and 3 months after dental procedure such as Tooth extraction, Implants or root canal.      6. High risk medication use  Assessment & Plan:  Infliximab/plaquenil  -- well tolerated and effective.   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 PATIENT NEEDS ANNUAL CXR.  CT of the lung March 2017- small area of Nodular ground glass change in the right lower lobe- F/u was recommended- Mild change of COPD.  Patient saw pulmonary  who personally reviewed the scan and told her that it was nothing to worry about.  CXR normal 5/23- Repeat Today.   S/p Covid vaccine. She does not plan on getting a booster.    Plan:    Cbc,Cmp 1/24 &  4/2024  Eye check with normal OCT- Due 10/2023.      Would hold Infliximab  for any infection or illness, Seek treatment and when well and illness is resolved you may restart medication.       7. Other fatigue          Assessment & Plan  1. Rheumatoid Arthritis.  She reports aching in multiple joints, including the right wrist, left shoulder, left knee, and left ankle. There is tenderness in the MCP joints of both hands and crepitus in the left knee, but no synovitis is noted today. She continues to take Plaquenil twice a day and receives infliximab (Remicade) infusions, with the last infusion on September 26, 2024. She will continue her current regimen of Plaquenil and Remicade. A prescription for Plaquenil will be sent to [facility].    2. Low Back  Pain.  She experiences constant aching in her lower back, with pain radiating down both legs. The lumbar spine is tender, and there is thoracic kyphosis. She is awaiting the results of a CT myelogram and an EMG ordered by Dr. Moreno and Dr. Sánchez, respectively. Depending on the findings, surgery may be considered.    3. Fibromyalgia.  Her fibromyalgia is currently flared up due to stress. She continues to take gabapentin 800 mg three times a day and uses Tylenol for pain relief. She also uses heat and ice for symptom management. She is advised to continue her current regimen and consider using Biofreeze or CBD lotion for additional relief.    4. Elevated Blood Glucose.  Her blood glucose level was recorded at 183 in September 2024, which was a non-fasting measurement. She is advised to monitor her blood sugar levels and follow up with her primary care provider for further management.    5. Health Maintenance.  She had a chest x-ray in May 2024, which was negative. Her next bone density test is due in January 2025. She is scheduled for a Prolia injection on October 18, 2024. An eye check for Plaquenil is due this month or next month.    Follow-up  Return in 4 months for follow-up.        Follow Up:   No follow-ups on file.       Rocio Olmos MD  [facility]     Encounter Administratively Closed by Health Information Management

## 2024-10-15 ENCOUNTER — TELEPHONE (OUTPATIENT)
Age: 64
End: 2024-10-15
Payer: MEDICARE

## 2024-10-15 NOTE — TELEPHONE ENCOUNTER
Caller: Lisa Mendez    Relationship to patient: Self    Best call back number: [phone]    Patient is needing: THE [facility]ION [facility] CALLED THE PT AND STATED THEY SENT OVER A REQUEST FOR A NEW ORDER ON HER PROLIA SHOT. PT IS DUE FOR ONE THIS FRIDAY. PLEASE CALL TO ADVISE

## 2024-10-15 NOTE — TELEPHONE ENCOUNTER
I cannot find any requests for new order in the chart. I spoke with pt who advised she gets her Prolia at [facility]. I faxed new order and most recent labs.

## 2024-10-17 ENCOUNTER — TELEPHONE (OUTPATIENT)
Age: 64
End: 2024-10-17
Payer: MEDICARE

## 2024-11-06 ENCOUNTER — TELEPHONE (OUTPATIENT)
Age: 64
End: 2024-11-06
Payer: MEDICARE

## 2024-11-06 NOTE — TELEPHONE ENCOUNTER
Hub staff attempted to follow warm transfer process and was unsuccessful     Caller: Lisa Mendez    Relationship to patient: Self    Patient is needing: NEED UPDATED INFUSION INFO -- INFUSION IS TOMORROW PLEASE ADVISE ASAP.

## 2024-11-06 NOTE — TELEPHONE ENCOUNTER
Hub staff attempted to follow warm transfer process and was unsuccessful     Caller: Lisa Mendez    Relationship to patient: Self    Patient is needing: NEED UPDATED INFUSION INFO

## 2024-11-11 ENCOUNTER — TELEPHONE (OUTPATIENT)
Age: 64
End: 2024-11-11
Payer: MEDICARE

## 2025-01-06 DIAGNOSIS — M81.8 OTHER OSTEOPOROSIS WITHOUT CURRENT PATHOLOGICAL FRACTURE: ICD-10-CM

## 2025-01-10 RX ORDER — HYDROXYCHLOROQUINE SULFATE 200 MG/1
TABLET, FILM COATED ORAL
Qty: 180 TABLET | Refills: 0 | Status: SHIPPED | OUTPATIENT
Start: 2025-01-10

## 2025-02-05 NOTE — PROGRESS NOTES
[facility] Follow Up Visit     Office Follow Up      Date: 02/07/2025   Patient Name: Lisa Mendez  MRN: [id number]  YOB: 1960    Referring Physician: Provider, No Known     Chief Complaint   Patient presents with    Follow-up    Rheumatoid Arthritis       History of Present Illness: Lisa Mendez is a 64 y.o. female who is here today for follow up on   History of Present Illness    Her last infliximab infusion was administered on 1/30/25 at [facility], which she believes is effective. She tolerates these well.   No recent serious infections or illnesses.   She continues on Plaquenil.   She reports aching in most of her joints, particularly the right wrist, left shoulder, left knee, and left ankle, but no swelling.  She reports experiencing morning stiffness lasting for an hour and a half, with pain intensity currently at 6 out of 10.     Prolonged sitting exacerbates her stiffness. She experiences constant aching in her back, which radiates down both legs. She has had CT and nothing surgical was seen.   She has been having pain in the right arm that radiates into her neck/shoulder and also down into her hands. She will be having a nerve conduction study and bone scan. She has had a CT myelogram of her neck and XR that were normal per her report.     Her fibromyalgia flares up during periods of stress. She takes gabapentin 800 mg three times a day for this condition from another provider. She uses heat and ice for relief. She manages the discomfort with Tylenol .    She continues to take calcium and vitamin D supplements. Prolia was last given at [facility] on 10/31/2024. Next dose due 4/2025. DEXA was due 1/2025.       Subjective     Allergies   Allergen Reactions    Clindamycin/Lincomycin Other (See Comments)     Degenerative disc disease       Darvon [Propoxyphene] Other (See Comments)     Caused Rheumatoid arthritis      Doxycycline  Other (See Comments)     Caused diverticulitis      Levofloxacin Other (See Comments)     Caused Ulcers     Rifampin Other (See Comments)     Cause Acid reflux      Septra [Sulfamethoxazole-Trimethoprim] Other (See Comments)     Caused Fibromyalgia       Tramadol Other (See Comments)     Caused Chronic nerve damage           Current Outpatient Medications:     hydroxychloroquine (PLAQUENIL) 200 MG tablet, TAKE 1 TABLET BY MOUTH 2 TIMES A DAY, Disp: 180 tablet, Rfl: 1    ALPRAZolam (XANAX) 0.25 MG tablet, Take 1 tablet by mouth Daily., Disp: , Rfl:     CALCIUM-VITAMIN D PO, Take 630 mg by mouth 2 (Two) Times a Day., Disp: , Rfl:     cholecalciferol (VITAMIN D3) 1000 units tablet, Take 5 tablets by mouth Daily., Disp: , Rfl:     Cyanocobalamin (VITAMIN B-12) 3000 MCG sublingual tablet, Place  under the tongue. TWO TIMES A WEEK, Disp: , Rfl:     denosumab (PROLIA) 60 MG/ML solution prefilled syringe syringe, Inject  under the skin into the appropriate area as directed 1 (One) Time., Disp: , Rfl:     FERROUS GLUCONATE PO, Take 324 mg by mouth Daily., Disp: , Rfl:     FLUoxetine (PROzac) 40 MG capsule, Take 1 capsule by mouth Every Night., Disp: , Rfl:     gabapentin (NEURONTIN) 300 MG capsule, Take 1 capsule by mouth 3 (Three) Times a Day., Disp: , Rfl:     glucosamine sulfate 500 MG capsule capsule, Take 1 capsule by mouth 3 (Three) Times a Day With Meals., Disp: , Rfl:     inFLIXimab (Remicade) 100 MG injection, Administer remicade 600mg iv every 6 weeks, Disp: , Rfl:     LORazepam (ATIVAN) 1 MG tablet, Take 0.5 tablets by mouth Daily., Disp: , Rfl:     Multiple Vitamins-Minerals (MULTIVITAMIN ADULT PO), Take 1 tablet by mouth 2 (Two) Times a Day. (Patient not taking: Reported on 5/30/2024), Disp: , Rfl:     Omega-3 Fatty Acids (OMEGA 3 PO), Take 1 capsule by mouth 2 (Two) Times a Day., Disp: , Rfl:     omeprazole (priLOSEC) 20 MG capsule, Take 1 capsule by mouth Daily. (Patient not taking: Reported on 5/30/2024),  Disp: , Rfl:     primidone (MYSOLINE) 50 MG tablet, Take 5 tablets by mouth 3 (Three) Times a Day., Disp: , Rfl:     Probiotic Product (PROBIOTIC PO), , Disp: , Rfl:     vitamin E 400 UNIT capsule, Take 2 capsules by mouth Daily., Disp: , Rfl:     Past Medical History:   Diagnosis Date    Abnormal EKG 08/20/2019    Arthritis     Chest pain in adult 08/20/2019    Diverticulitis     DVT (deep venous thrombosis) 2007    LEFT LEG    Family history of coronary artery disease 08/20/2019    Fibromyalgia     GERD (gastroesophageal reflux disease)     History of transfusion     STCarlos HUGHES, 2 UNITS, NO REACTION    Hx of degenerative disc disease     Low platelet count     Migraine     MRSA (methicillin resistant Staphylococcus aureus) 2020    pain pump incision    Nerve damage     Neuropathy     Rheumatoid arthritis         Past Surgical History:   Procedure Laterality Date    BACK SURGERY      X 4     CHOLECYSTECTOMY      COLONOSCOPY      ENDOSCOPY      GASTRIC BYPASS      HYSTERECTOMY      NECK SURGERY      SPINAL CORD STIMULATOR IMPLANT      X 2    SPINAL CORD STIMULATOR REMOVAL      TOTAL SHOULDER ARTHROPLASTY Right 02/2022    TOTAL SHOULDER REVISION Right 6/22/2023    Procedure: REVISION TOTAL SHOULDER ARTHROPLASTY TO REVERSE TOTAL SHOULDER ARTHROPLASTY - RIGHT;  Surgeon: Fletcher Felder MD;  Location: [facility];  Service: Orthopedics;  Laterality: Right;    TUBAL ABDOMINAL LIGATION         History reviewed. No pertinent family history.     Social History     Socioeconomic History    Marital status:    Tobacco Use    Smoking status: Never     Passive exposure: Never    Smokeless tobacco: Never   Vaping Use    Vaping status: Never Used   Substance and Sexual Activity    Alcohol use: No    Drug use: No    Sexual activity: Defer       Review of Systems not completed by the patient today    I have reviewed and updated the patient's chief complaint, history of present illness, review of systems, past medical  "history, surgical history, family history, social history, medications and allergy list as appropriate.     Objective    Vital Signs:   Vitals:    02/07/25 1109   BP: 110/60   Pulse: 79   Temp: 97.3 °F (36.3 °C)   Weight: 79.4 kg (175 lb)   Height: 172.7 cm (68\")   PainSc:   6   PainLoc: Generalized         Body mass index is 26.61 kg/m².  Defer to PCP    Physical Exam  Patient is an alert female, no acute distress.  Head is atraumatic, normocephalic. Pupils are equal and round. Extraocular muscles intact. Oropharynx is negative, but the tongue is slightly dry today.  Lungs are clear.  Heart is regular without rubs, gallops, or murmurs.  Lumbar spine is tender. Thoracic is not but there is thoracic kyphosis. Right elbow is tender.   There is no synovitis today.   There is crepitus in the left knee. Knees are tender, but no synovitis today.  Ambulating with cane.  Skin is negative for any acute rashes.      Assessment / Plan    Assessment/Plan:   Diagnoses and all orders for this visit:    1. Rheumatoid arthritis involving multiple sites, unspecified whether rheumatoid factor present (Primary)  Assessment & Plan:  On Infliximab/plaquenil- Well tolerated and effective on current doses  Some increased aches in the cold weather- feet, ankles, knees, shoulders.  Denies CHF, Chronic infection, MS. + TB and treated. H/o diverticulitis, Hepatitis, UC, COPD.     Plan:  Continue Infliximab. Last dose was received 1/30/25 at [facility].  Continue Plaquenil BID. Previously she did not tolerate once daily dosing. We will continue this dose as long as she continues to have normal OCT exams. Most recent eye exam stable 1/2025.  Labs 2/3/25 stable. New order given to do before next visit.  RTC in 4 months.    Orders:  -     Hepatitis Panel, Acute; Future  -     Cancel: QuantiFERON-TB Gold Plus; Future  -     Comprehensive Metabolic Panel; Future  -     C-reactive Protein; Future  -     Sedimentation Rate; Future  -     CBC Auto " Differential; Future  -     hydroxychloroquine (PLAQUENIL) 200 MG tablet; TAKE 1 TABLET BY MOUTH 2 TIMES A DAY  Dispense: 180 tablet; Refill: 1    2. Fibromyalgia  Assessment & Plan:  Doing well. Flares with more stress and poor sleep.   Chronic sx wax/wane.  Currently on Fluoxetine and Gabapentin from other providers.    Plan:  Fibro flares with bad weather and stress.   Continue above medications with PCP  Heat, ice.  Exercise and rest.    Orders:  -     Comprehensive Metabolic Panel; Future  -     C-reactive Protein; Future  -     Sedimentation Rate; Future  -     CBC Auto Differential; Future    3. Other fatigue  Assessment & Plan:  Stable    Orders:  -     Hepatitis Panel, Acute; Future  -     Cancel: QuantiFERON-TB Gold Plus; Future    4. Other osteoporosis without current pathological fracture  Assessment & Plan:  Dexa scan 1/4/23 L femoral neck -2.2 decreased 14.6%, L total hip -2 decreased 11.8%,  Spine is to arthritic/surgery, Forearm 1/3rd value is -2.8, Total -2.1 decreased 11%.  Started Fosamax July 2021- Discontinue 1/4/2023 due to inefficacy  Prolia started 2/2023    Plan:  Continue Prolia. Last dose given 10/2024. Next dose due 4/2025.  Lab order given to do in April prior to Prolia  Calcium 800-1000mg per day or 2 ES TUMS daily.  Needs Vitamin D 1000-2000IU daily of OTC D  Weight bearing exercise.  Update DEXA now  Off Prolia 3 months prior and 3 months after dental procedure such as Tooth extraction, Implants or root canal.    Orders:  -     DEXA Bone Density Axial; Future  -     Vitamin D,25-Hydroxy; Future  -     Comprehensive Metabolic Panel; Future  -     CBC (No Diff); Future    5. Positive QuantiFERON-TB Gold test  Assessment & Plan:  Negative 6/2018 but she had positive TSPOT in 11/2020.  She had TSPOT 12/2020 that was positive .  She completed INH x 6 months.    Plan:  CXR negative 5/2024- update annually  No evidence of active TB.    Orders:  -     Cancel: QuantiFERON-TB Gold Plus;  Future    6. High risk medication use  Assessment & Plan:  Infliximab/plaquenil  -- well tolerated and effective.   Positive Tspot 12/20 - S/p INH : DO NOT ORDER QFN OR TSPOT. PATIENT NEEDS ANNUAL CXR.  CXR up to date and normal 5/2024  CT of the lung March 2017- small area of Nodular ground glass change in the right lower lobe- F/u was recommended- Mild change of COPD.  Patient saw pulmonary in the past who personally reviewed the scan and told her that it was nothing to worry about.    Plan:    Cbc,Cmp 2/2025 stable  Eye check with normal OCT- 1/2025  CXR due 5/2025    Would hold Infliximab  for any infection or illness, Seek treatment and when well and illness is resolved you may restart medication.     Orders:  -     Hepatitis Panel, Acute; Future  -     Cancel: QuantiFERON-TB Gold Plus; Future  -     Comprehensive Metabolic Panel; Future  -     C-reactive Protein; Future  -     Sedimentation Rate; Future  -     CBC Auto Differential; Future  -     hydroxychloroquine (PLAQUENIL) 200 MG tablet; TAKE 1 TABLET BY MOUTH 2 TIMES A DAY  Dispense: 180 tablet; Refill: 1    7. Encounter for medication monitoring  Assessment & Plan:  Update hepatitis panel with next labs    Orders:  -     Hepatitis Panel, Acute; Future  -     Cancel: QuantiFERON-TB Gold Plus; Future  -     Comprehensive Metabolic Panel; Future  -     C-reactive Protein; Future  -     Sedimentation Rate; Future  -     CBC Auto Differential; Future  -     Vitamin D,25-Hydroxy; Future  -     Comprehensive Metabolic Panel; Future  -     CBC (No Diff); Future        Follow Up:   Return in about 4 months (around 6/7/2025) for JAMEL Cowan, JAMEL Storey.       JAMEL Cowan  [facility]

## 2025-02-07 ENCOUNTER — OFFICE VISIT (OUTPATIENT)
Age: 65
End: 2025-02-07
Payer: MEDICARE

## 2025-02-07 VITALS
WEIGHT: 175 LBS | HEIGHT: 68 IN | BODY MASS INDEX: 26.52 KG/M2 | SYSTOLIC BLOOD PRESSURE: 110 MMHG | TEMPERATURE: 97.3 F | HEART RATE: 79 BPM | DIASTOLIC BLOOD PRESSURE: 60 MMHG

## 2025-02-07 DIAGNOSIS — M81.8 OTHER OSTEOPOROSIS WITHOUT CURRENT PATHOLOGICAL FRACTURE: ICD-10-CM

## 2025-02-07 DIAGNOSIS — Z51.81 ENCOUNTER FOR MEDICATION MONITORING: ICD-10-CM

## 2025-02-07 DIAGNOSIS — Z79.899 HIGH RISK MEDICATION USE: ICD-10-CM

## 2025-02-07 DIAGNOSIS — R53.83 OTHER FATIGUE: ICD-10-CM

## 2025-02-07 DIAGNOSIS — M06.9 RHEUMATOID ARTHRITIS INVOLVING MULTIPLE SITES, UNSPECIFIED WHETHER RHEUMATOID FACTOR PRESENT: Primary | ICD-10-CM

## 2025-02-07 DIAGNOSIS — R76.12 POSITIVE QUANTIFERON-TB GOLD TEST: ICD-10-CM

## 2025-02-07 DIAGNOSIS — M79.7 FIBROMYALGIA: ICD-10-CM

## 2025-02-07 RX ORDER — HYDROXYCHLOROQUINE SULFATE 200 MG/1
TABLET, FILM COATED ORAL
Qty: 180 TABLET | Refills: 1 | Status: SHIPPED | OUTPATIENT
Start: 2025-02-07

## 2025-02-07 NOTE — ASSESSMENT & PLAN NOTE
Negative 6/2018 but she had positive TSPOT in 11/2020.  She had TSPOT 12/2020 that was positive .  She completed INH x 6 months.    Plan:  CXR negative 5/2024- update annually  No evidence of active TB.

## 2025-02-07 NOTE — ASSESSMENT & PLAN NOTE
On Infliximab/plaquenil- Well tolerated and effective on current doses  Some increased aches in the cold weather- feet, ankles, knees, shoulders.  Denies CHF, Chronic infection, MS. + TB and treated. H/o diverticulitis, Hepatitis, UC, COPD.     Plan:  Continue Infliximab. Last dose was received 1/30/25 at [facility].  Continue Plaquenil BID. Previously she did not tolerate once daily dosing. We will continue this dose as long as she continues to have normal OCT exams. Most recent eye exam stable 1/2025.  Labs 2/3/25 stable. New order given to do before next visit.  RTC in 4 months.

## 2025-02-07 NOTE — ASSESSMENT & PLAN NOTE
Dexa scan 1/4/23 L femoral neck -2.2 decreased 14.6%, L total hip -2 decreased 11.8%,  Spine is to arthritic/surgery, Forearm 1/3rd value is -2.8, Total -2.1 decreased 11%.  Started Fosamax July 2021- Discontinue 1/4/2023 due to inefficacy  Prolia started 2/2023    Plan:  Continue Prolia. Last dose given 10/2024. Next dose due 4/2025.  Lab order given to do in April prior to Prolia  Calcium 800-1000mg per day or 2 ES TUMS daily.  Needs Vitamin D 1000-2000IU daily of OTC D  Weight bearing exercise.  Update DEXA now  Off Prolia 3 months prior and 3 months after dental procedure such as Tooth extraction, Implants or root canal.

## 2025-02-07 NOTE — ASSESSMENT & PLAN NOTE
Doing well. Flares with more stress and poor sleep.   Chronic sx wax/wane.  Currently on Fluoxetine and Gabapentin from other providers.    Plan:  Fibro flares with bad weather and stress.   Continue above medications with PCP  Heat, ice.  Exercise and rest.

## 2025-02-07 NOTE — ASSESSMENT & PLAN NOTE
Infliximab/plaquenil  -- well tolerated and effective.   Positive Tspot 12/20 - S/p INH : DO NOT ORDER QFN OR TSPOT. PATIENT NEEDS ANNUAL CXR.  CXR up to date and normal 5/2024  CT of the lung March 2017- small area of Nodular ground glass change in the right lower lobe- F/u was recommended- Mild change of COPD.  Patient saw pulmonary in the past who personally reviewed the scan and told her that it was nothing to worry about.    Plan:    Cbc,Cmp 2/2025 stable  Eye check with normal OCT- 1/2025  CXR due 5/2025    Would hold Infliximab  for any infection or illness, Seek treatment and when well and illness is resolved you may restart medication.

## 2025-06-11 NOTE — ASSESSMENT & PLAN NOTE
Negative 6/2018 but she had positive TSPOT in 11/2020.  She had TSPOT 12/2020 that was positive   She completed INH x 6 months.  DO NOT ORDER QTB OR TSPOT-- PATIENT NEEDS ANNUAL CXR.    CXR negative 5/2024- update today 6/18/25  No evidence of active TB today.

## 2025-06-11 NOTE — ASSESSMENT & PLAN NOTE
Infliximab/plaquenil  -- well tolerated and effective.   Hepatitis panel negative 6/3/2025  Positive Tspot 12/20 - S/p INH   DO NOT ORDER QTB OR TSPOT-- PATIENT NEEDS ANNUAL CXR.  CT of the lung March 2017- small area of Nodular ground glass change in the right lower lobe- F/u was recommended- Mild change of COPD.  Patient saw pulmonary in the past who personally reviewed the scan and told her that it was nothing to worry about.    Cbc,Cmp 6/2025 stable  Eye check with normal OCT- 1/2025  CXR due 5/2025- obtain today 6/18/25    Would hold infliximab for any infection or illness, Seek treatment and when well and illness is resolved you may restart medication.

## 2025-06-11 NOTE — ASSESSMENT & PLAN NOTE
Dexa scan 1/4/23 L femoral neck -2.2 decreased 14.6%, L total hip -2 decreased 11.8%,  Spine is to arthritic/surgery, Forearm 1/3rd value is -2.8, Total -2.1 decreased 11%.  Started Fosamax July 2021- Discontinue 1/4/2023 due to inefficacy  Prolia started 2/2023  DEXA 2/25/2025 showed osteopenia of the right femoral neck with T-score of -2.1. There was also osteopenia in the forearm     Continue Prolia. Last dose given 5/1/25. Next dose due 11/2025.  Will check CMP and vitamin D level prior to each Prolia injection. New order given today.  Calcium 800-1000mg per day or 2 ES TUMS daily.  Needs Vitamin D 1000-2000IU daily of OTC D  Weight bearing exercise.  Update DEXA 2/2027    Off Prolia 3 months prior and 3 months after dental procedure such as Tooth extraction, Implants or root canal.   Risk benefits of bisphosphonates/prolia discussed in detail including but not limited to osteonecrosis jaw, atypical femoral fracture, bone death, kidney failure, hypocalcemia.

## 2025-06-11 NOTE — ASSESSMENT & PLAN NOTE
Patient has had issues with pustular skin lesions on her hips in the past. Cx was MRSA in past.  While on Infliximab she has not had these lesions for several years. Have flared off Infliximab requiring treatment.    Resolved.  Suspect they represented HS and were controlled with Infliximab. No reoccurrence.

## 2025-06-11 NOTE — ASSESSMENT & PLAN NOTE
On Infliximab/plaquenil- Well tolerated and effective on current doses  Some increased aches in the cold weather- feet, ankles, knees, shoulders.  Denies CHF, Chronic infection, MS. + TB and treated. H/o diverticulitis, Hepatitis, UC, COPD.     Continue infliximab infusions at [facility] every 6 weeks. Infusions are helpful and well tolerated.  Continue Plaquenil BID. Previously she did not tolerate once daily dosing. We will continue this dose as long as she continues to have normal OCT exams. Most recent eye exam stable 1/2025.  Labs 6/2025 stable. New order given to do before next visit.  RTC in 4-5 months

## 2025-06-11 NOTE — ASSESSMENT & PLAN NOTE
Flares with more stress and poor sleep.   Chronic sx wax/wane.  Currently on Fluoxetine and gabapentin from other providers.    Continue above medications with PCP  Heat, ice  Exercise and rest

## 2025-06-11 NOTE — PROGRESS NOTES
[facility] [place] [facility] Follow Up Visit     Office Follow Up      Date: 06/18/2025   Patient Name: Lisa Mendez  MRN: [id number]  YOB: 1960    Referring Physician: No ref. provider found     Chief Complaint   Patient presents with    Rheumatoid Arthritis       History of Present Illness: Lisa Mendez is a 65 y.o. female who is here today for follow up of RA.    She reports experiencing morning stiffness lasting 30 minutes. Her pain is 4/10.  Her last infliximab infusion was administered on 6/5/25 at [facility], which she gets every 6 weeks. She feels her infusions are effective. She tolerates these well.   No recent serious infections or illnesses.   She continues on Plaquenil BID. She reports eye exam is up to date.  No joint swelling.    Her fibromyalgia flares up during periods of stress. She takes gabapentin 300 mg three times a day for this condition from another provider. She uses heat and ice for relief. She manages the discomfort with Tylenol.    She continues to take calcium and vitamin D supplements. Prolia was last given at [facility] on 5/1/25. This is well tolerated. Next dose due 11/2025. DEXA 2/25/25 with osteopenia in right femoral neck and forearm which is an improvement.     Subjective     Allergies   Allergen Reactions    Mushroom Nausea And Vomiting    Clindamycin/Lincomycin Other (See Comments)     Degenerative disc disease       Darvon [Propoxyphene] Other (See Comments)     Caused Rheumatoid arthritis      Doxycycline Other (See Comments)     Caused diverticulitis      Levofloxacin Other (See Comments)     Caused Ulcers     Rifampin Other (See Comments)     Cause Acid reflux      Septra [Sulfamethoxazole-Trimethoprim] Other (See Comments)     Caused Fibromyalgia       Tramadol Other (See Comments)     Caused Chronic nerve damage           Current Outpatient Medications:     Accu-Chek Guide Test test strip, 1 each by Other route As  Needed., Disp: , Rfl:     CALCIUM-VITAMIN D PO, Take 630 mg by mouth 2 (Two) Times a Day., Disp: , Rfl:     cholecalciferol (VITAMIN D3) 1000 units tablet, Take 5 tablets by mouth Daily., Disp: , Rfl:     Cyanocobalamin (VITAMIN B-12) 3000 MCG sublingual tablet, Place  under the tongue. TWO TIMES A WEEK, Disp: , Rfl:     denosumab (PROLIA) 60 MG/ML solution prefilled syringe syringe, Inject  under the skin into the appropriate area as directed 1 (One) Time., Disp: , Rfl:     FERROUS GLUCONATE PO, Take 324 mg by mouth Daily., Disp: , Rfl:     FLUoxetine (PROzac) 40 MG capsule, Take 1 capsule by mouth Every Night., Disp: , Rfl:     gabapentin (NEURONTIN) 300 MG capsule, Take 1 capsule by mouth 3 (Three) Times a Day., Disp: , Rfl:     glucosamine sulfate 500 MG capsule capsule, Take 1 capsule by mouth 3 (Three) Times a Day With Meals., Disp: , Rfl:     hydroxychloroquine (PLAQUENIL) 200 MG tablet, TAKE 1 TABLET BY MOUTH 2 TIMES A DAY, Disp: 180 tablet, Rfl: 1    inFLIXimab (Remicade) 100 MG injection, Administer remicade 600mg iv every 6 weeks, Disp: , Rfl:     LORazepam (ATIVAN) 1 MG tablet, Take 0.5 tablets by mouth Daily., Disp: , Rfl:     Omega-3 Fatty Acids (OMEGA 3 PO), Take 1 capsule by mouth 2 (Two) Times a Day., Disp: , Rfl:     omeprazole (priLOSEC) 20 MG capsule, Take 2 capsules by mouth Daily., Disp: , Rfl:     primidone (MYSOLINE) 50 MG tablet, Take 5 tablets by mouth 3 (Three) Times a Day., Disp: , Rfl:     Probiotic Product (PROBIOTIC PO), , Disp: , Rfl:     vitamin E 400 UNIT capsule, Take 2 capsules by mouth Daily., Disp: , Rfl:     Past Medical History:   Diagnosis Date    Abnormal EKG 08/20/2019    Arthritis     Chest pain in adult 08/20/2019    Diverticulitis     DVT (deep venous thrombosis) 2007    LEFT LEG    Family history of coronary artery disease 08/20/2019    Fibromyalgia     GERD (gastroesophageal reflux disease)     History of transfusion     [facility], 2 UNITS, NO REACTION    Hx of  "degenerative disc disease     Low platelet count     Migraine     MRSA (methicillin resistant Staphylococcus aureus) 2020    pain pump incision    Nerve damage     Neuropathy     Rheumatoid arthritis         Past Surgical History:   Procedure Laterality Date    BACK SURGERY      X 4     CHOLECYSTECTOMY      COLONOSCOPY      ENDOSCOPY      GASTRIC BYPASS      HYSTERECTOMY      NECK SURGERY      SPINAL CORD STIMULATOR IMPLANT      X 2    SPINAL CORD STIMULATOR REMOVAL      TOTAL SHOULDER ARTHROPLASTY Right 02/2022    TOTAL SHOULDER REVISION Right 6/22/2023    Procedure: REVISION TOTAL SHOULDER ARTHROPLASTY TO REVERSE TOTAL SHOULDER ARTHROPLASTY - RIGHT;  Surgeon: Fletcher Felder MD;  Location: [facility];  Service: Orthopedics;  Laterality: Right;    TUBAL ABDOMINAL LIGATION         History reviewed. No pertinent family history.     Social History     Socioeconomic History    Marital status:    Tobacco Use    Smoking status: Never     Passive exposure: Never    Smokeless tobacco: Never   Vaping Use    Vaping status: Never Used   Substance and Sexual Activity    Alcohol use: No    Drug use: No    Sexual activity: Defer       Review of Systems   Constitutional:  Positive for chills and fatigue.   HENT:  Positive for rhinorrhea and sinus pressure.    Endocrine: Positive for cold intolerance.   Musculoskeletal:  Positive for arthralgias, myalgias and neck pain.   Skin:  Positive for dry skin and bruise.   Hematological:  Bruises/bleeds easily.   All other systems reviewed and are negative.      I have reviewed and updated the patient's chief complaint, history of present illness, review of systems, past medical history, surgical history, family history, social history, medications and allergy list as appropriate.     Objective    Vital Signs:   Vitals:    06/18/25 1316   BP: 120/60   Pulse: 72   Temp: 97.7 °F (36.5 °C)   Weight: 79.4 kg (175 lb)   Height: 172.7 cm (68\")   PainSc: 4        Body mass index is " 26.61 kg/m².  Defer to PCP    Physical Exam  Patient is an alert female, no acute distress.  Head is atraumatic, normocephalic. Pupils are equal and round. Extraocular muscles intact. Oropharynx is negative, but the tongue is slightly dry today.  Normal resp effort. Heart rate and rhythm are normal.  There is thoracic kyphosis.   There is no synovitis today. She has OA changes in hands.  There is crepitus in the left knee.   Ambulating with cane.  Skin is negative for any acute rashes.      Assessment / Plan      Assessment & Plan  Rheumatoid arthritis involving multiple sites, unspecified whether rheumatoid factor present  On Infliximab/plaquenil- Well tolerated and effective on current doses  Some increased aches in the cold weather- feet, ankles, knees, shoulders.  Denies CHF, Chronic infection, MS. + TB and treated. H/o diverticulitis, Hepatitis, UC, COPD.     Continue infliximab infusions at [facility] every 6 weeks. Infusions are helpful and well tolerated.  Continue Plaquenil BID. Previously she did not tolerate once daily dosing. We will continue this dose as long as she continues to have normal OCT exams. Most recent eye exam stable 1/2025.  Labs 6/2025 stable. New order given to do before next visit.  RTC in 4-5 months   High risk medication use  Infliximab/plaquenil  -- well tolerated and effective.   Hepatitis panel negative 6/3/2025  Positive Tspot 12/20 - S/p INH   DO NOT ORDER QTB OR TSPOT-- PATIENT NEEDS ANNUAL CXR.  CT of the lung March 2017- small area of Nodular ground glass change in the right lower lobe- F/u was recommended- Mild change of COPD.  Patient saw pulmonary in the past who personally reviewed the scan and told her that it was nothing to worry about.    Cbc,Cmp 6/2025 stable  Eye check with normal OCT- 1/2025  CXR due 5/2025- obtain today 6/18/25    Would hold infliximab for any infection or illness, Seek treatment and when well and illness is resolved you may restart medication.    Positive QuantiFERON-TB Gold test  Negative 6/2018 but she had positive TSPOT in 11/2020.  She had TSPOT 12/2020 that was positive   She completed INH x 6 months.  DO NOT ORDER QTB OR TSPOT-- PATIENT NEEDS ANNUAL CXR.    CXR negative 5/2024- update today 6/18/25  No evidence of active TB today.  Fibromyalgia  Flares with more stress and poor sleep.   Chronic sx wax/wane.  Currently on Fluoxetine and gabapentin from other providers.    Continue above medications with PCP  Heat, ice  Exercise and rest  Other osteoporosis without current pathological fracture  Dexa scan 1/4/23 L femoral neck -2.2 decreased 14.6%, L total hip -2 decreased 11.8%,  Spine is to arthritic/surgery, Forearm 1/3rd value is -2.8, Total -2.1 decreased 11%.  Started Fosamax July 2021- Discontinue 1/4/2023 due to inefficacy  Prolia started 2/2023  DEXA 2/25/2025 showed osteopenia of the right femoral neck with T-score of -2.1. There was also osteopenia in the forearm     Continue Prolia. Last dose given 5/1/25. Next dose due 11/2025.  Will check CMP and vitamin D level prior to each Prolia injection. New order given today.  Calcium 800-1000mg per day or 2 ES TUMS daily.  Needs Vitamin D 1000-2000IU daily of OTC D  Weight bearing exercise.  Update DEXA 2/2027    Off Prolia 3 months prior and 3 months after dental procedure such as Tooth extraction, Implants or root canal.   Risk benefits of bisphosphonates/prolia discussed in detail including but not limited to osteonecrosis jaw, atypical femoral fracture, bone death, kidney failure, hypocalcemia.    Skin lesion  Patient has had issues with pustular skin lesions on her hips in the past. Cx was MRSA in past.  While on Infliximab she has not had these lesions for several years. Have flared off Infliximab requiring treatment.    Resolved.  Suspect they represented HS and were controlled with Infliximab. No reoccurrence.    Discussed plan of care in detail with the patient today.  Patient verbalized  understanding and agrees.    I confirm accuracy of unchanged data/findings which have been carried forward from previous visit.  I have updated appropriately those that have changed.      Follow Up:   Return in about 4 months (around 10/27/2025) for JAMEL Benavidez, JAMEL Cowan.     JAMEL Cowan  [facility] [place] [facility]

## 2025-06-12 ENCOUNTER — TRANSCRIBE ORDERS (OUTPATIENT)
Dept: ADMINISTRATIVE | Facility: HOSPITAL | Age: 65
End: 2025-06-12
Payer: MEDICARE

## 2025-06-12 DIAGNOSIS — M81.0 AGE-RELATED OSTEOPOROSIS WITHOUT CURRENT PATHOLOGICAL FRACTURE: Primary | ICD-10-CM

## 2025-06-18 ENCOUNTER — OFFICE VISIT (OUTPATIENT)
Age: 65
End: 2025-06-18
Payer: MEDICARE

## 2025-06-18 ENCOUNTER — TELEPHONE (OUTPATIENT)
Age: 65
End: 2025-06-18

## 2025-06-18 VITALS
HEIGHT: 68 IN | TEMPERATURE: 97.7 F | BODY MASS INDEX: 26.52 KG/M2 | HEART RATE: 72 BPM | DIASTOLIC BLOOD PRESSURE: 60 MMHG | WEIGHT: 175 LBS | SYSTOLIC BLOOD PRESSURE: 120 MMHG

## 2025-06-18 DIAGNOSIS — R53.83 OTHER FATIGUE: ICD-10-CM

## 2025-06-18 DIAGNOSIS — M79.7 FIBROMYALGIA: ICD-10-CM

## 2025-06-18 DIAGNOSIS — M81.8 OTHER OSTEOPOROSIS WITHOUT CURRENT PATHOLOGICAL FRACTURE: ICD-10-CM

## 2025-06-18 DIAGNOSIS — R76.12 POSITIVE QUANTIFERON-TB GOLD TEST: ICD-10-CM

## 2025-06-18 DIAGNOSIS — M06.9 RHEUMATOID ARTHRITIS INVOLVING MULTIPLE SITES, UNSPECIFIED WHETHER RHEUMATOID FACTOR PRESENT: Primary | ICD-10-CM

## 2025-06-18 DIAGNOSIS — Z79.899 HIGH RISK MEDICATION USE: ICD-10-CM

## 2025-06-18 DIAGNOSIS — L98.9 SKIN LESION: ICD-10-CM

## 2025-06-18 DIAGNOSIS — Z51.81 ENCOUNTER FOR MEDICATION MONITORING: ICD-10-CM

## 2025-06-18 RX ORDER — BLOOD SUGAR DIAGNOSTIC
1 STRIP MISCELLANEOUS AS NEEDED
COMMUNITY
Start: 2025-06-11

## 2025-06-18 RX ORDER — HYDROXYCHLOROQUINE SULFATE 200 MG/1
TABLET, FILM COATED ORAL
Qty: 180 TABLET | Refills: 1 | Status: SHIPPED | OUTPATIENT
Start: 2025-06-18

## 2025-06-18 NOTE — TELEPHONE ENCOUNTER
Patient's next Prolia injection due 11/2025 and she wanted to make sure [facility] had her order for this. Thanks